# Patient Record
Sex: FEMALE | Race: BLACK OR AFRICAN AMERICAN | NOT HISPANIC OR LATINO | Employment: FULL TIME | URBAN - METROPOLITAN AREA
[De-identification: names, ages, dates, MRNs, and addresses within clinical notes are randomized per-mention and may not be internally consistent; named-entity substitution may affect disease eponyms.]

---

## 2017-01-31 ENCOUNTER — OFFICE VISIT (OUTPATIENT)
Dept: OBGYN | Facility: CLINIC | Age: 27
End: 2017-01-31
Payer: COMMERCIAL

## 2017-01-31 ENCOUNTER — OFFICE VISIT (OUTPATIENT)
Dept: DERMATOLOGY | Facility: CLINIC | Age: 27
End: 2017-01-31
Payer: COMMERCIAL

## 2017-01-31 VITALS
OXYGEN SATURATION: 100 % | SYSTOLIC BLOOD PRESSURE: 134 MMHG | BODY MASS INDEX: 35.99 KG/M2 | WEIGHT: 243 LBS | HEIGHT: 69 IN | HEART RATE: 88 BPM | DIASTOLIC BLOOD PRESSURE: 79 MMHG

## 2017-01-31 VITALS — DIASTOLIC BLOOD PRESSURE: 79 MMHG | SYSTOLIC BLOOD PRESSURE: 118 MMHG | OXYGEN SATURATION: 98 % | HEART RATE: 79 BPM

## 2017-01-31 DIAGNOSIS — L91.0 KELOID CICATRIX: ICD-10-CM

## 2017-01-31 DIAGNOSIS — L70.0 ACNE VULGARIS: Primary | ICD-10-CM

## 2017-01-31 DIAGNOSIS — N97.9 FEMALE INFERTILITY: Primary | ICD-10-CM

## 2017-01-31 DIAGNOSIS — N97.0 ANOVULATION: ICD-10-CM

## 2017-01-31 PROCEDURE — 99203 OFFICE O/P NEW LOW 30 MIN: CPT | Mod: 25 | Performed by: PHYSICIAN ASSISTANT

## 2017-01-31 PROCEDURE — 99214 OFFICE O/P EST MOD 30 MIN: CPT | Performed by: OBSTETRICS & GYNECOLOGY

## 2017-01-31 PROCEDURE — 11900 INJECT SKIN LESIONS </W 7: CPT | Performed by: PHYSICIAN ASSISTANT

## 2017-01-31 RX ORDER — CEPHALEXIN 500 MG/1
CAPSULE ORAL
Qty: 60 CAPSULE | Refills: 2 | Status: SHIPPED | OUTPATIENT
Start: 2017-01-31 | End: 2017-04-18

## 2017-01-31 RX ORDER — CLOMIPHENE CITRATE 50 MG/1
50 TABLET ORAL DAILY
Qty: 5 TABLET | Refills: 0 | Status: SHIPPED | OUTPATIENT
Start: 2017-01-31 | End: 2017-05-12

## 2017-01-31 RX ORDER — TRETINOIN 0.25 MG/G
CREAM TOPICAL
Qty: 45 G | Refills: 11 | Status: SHIPPED | OUTPATIENT
Start: 2017-01-31 | End: 2017-05-12

## 2017-01-31 RX ORDER — AZELAIC ACID 0.15 G/G
GEL TOPICAL
Qty: 50 G | Refills: 11 | Status: SHIPPED | OUTPATIENT
Start: 2017-01-31 | End: 2017-05-12

## 2017-01-31 NOTE — NURSING NOTE
"Initial /79 mmHg  Pulse 79  SpO2 98%  LMP 01/24/2017 Estimated body mass index is 36.41 kg/(m^2) as calculated from the following:    Height as of an earlier encounter on 1/31/17: 1.74 m (5' 8.5\").    Weight as of an earlier encounter on 1/31/17: 110.224 kg (243 lb). .      "

## 2017-01-31 NOTE — PROGRESS NOTES
HPI:   Syed Orantes is a 26 year old female who presents for acne.  chief complaint  Condition has been present for: several years - had as a teenager and then came back again in early 20's.  Pt complains of pain: Yes     Previous treatments include: did 10 days worth of tretinoin and doxycycline which didn't seem to help. Was placed on spironolactone but only did this a short time.   Menstrual cycles are regular: Yes   Condition flares around period: Yes  Areas Involved: face, chest and back  IPLEDGE #:   School:   Current Outpatient Prescriptions   Medication Sig Dispense Refill     cephALEXin (KEFLEX) 500 MG capsule 1 tab PO BID 60 capsule 2     Azelaic Acid 15 % gel Apply to face and chest 50 g 11     tretinoin (RETIN-A) 0.025 % cream Spread a pea size amount into affected area topically at bedtime.  Use sunscreen SPF>20. 45 g 11     clomiPHENE (CLOMID) 50 MG tablet Take 1 tablet (50 mg) by mouth daily Starting 3 days after the bleeding begins 5 tablet 0     multivitamin, therapeutic with minerals (MULTI-VITAMIN) TABS Take 1 tablet by mouth daily 100 tablet      magnesium 250 MG tablet Take 1 tablet by mouth daily 30 tablet      Allergies   Allergen Reactions     Amoxicillin Hives            Pcn [Penicillin G] Hives     Denies any other skin complaints, in general feels well: Yes  Review of symptoms otherwise negative:Yes    PHYSICAL EXAM:   A&Ox3: Yes   Well developed/well nourished female Yes   Mood appropriate Yes        Type 6 skin. Mood appropriate  Alert and Oriented X 3. Well developed, well nourished in no distress.  General appearance: Normal  Head including face: Normal  Eyes: conjunctiva and lids: Normal  Mouth: Lips, teeth, gums: Normal  Neck: Normal  Back: 1-2+ comedones, 6 keloid cicatrix  Cardiovascular: Exam of peripheral vascular system by observation for swelling, varicosities, edema: Normal  Extremities: digits/nails (clubbing): Normal  Right upper extremity: Normal  Left upper  extremity: Normal  Right lower extremity: Normal  Left lower extremity: Normal  Skin: Scalp and body hair: See below     Comedones Papules/Pustules Cysts Staining Scarring   Face/Neck 1+ 1+ jawline 0 1+ jawline 0   Chest 1+ 1+ 0 0 0   Back 1+ 0 0 0 6 keloids     Telangiectasias: No Fixed Erythema: No Exoriations: No   Other Physical Exam Findings:    ASSESSMENT & PLAN:     1. Acne Vulgaris - advised on diagnosis and treatment options. Discussed use of topical medications and antibiotics. She is currently trying to become pregnant - advised that we are quite limited with pregnancy and nursing. Was going to start Finacea, but pharmacy called and said this wasn't covered. Discussed starting an antibiotic as well as tretinoin. Advised that she may use tretinoin up until she becomes pregnant, but must stop once pregnant. Does tend to have sensitive skin.   --Start Keflex 500 mg BID x 3 months then stop  --Start tretinoin 0.025% cream to face, chest and back  --Gentle cleanser BID  --Gentle moisturizer BID    2.  Keloid cicatrix on back x 6 - advised. Discussed ILK to make areas flatter; advised that injections will do nothing for the color or skin texture. She would like to proceed with this.  --Kenalog 40 mg/cc injected to back x 6. Total of 0.4 cc's used.  Advised on risk of atrophy and failure to respond. Advised on aftercare.   --Can do once every 4 weeks as needed            Pt advised on use and risks including photosensitivity, allergic reactions, GI upset, headaches, nausea, erythema, scaling, vertigo, asthralgias, blood clots:Yes    Follow-up: 1 month  CC:   Scribed By: Quiana Lazo, MS, PA-C

## 2017-01-31 NOTE — PATIENT INSTRUCTIONS
If you do not have bleeding next month, let me know so I may place an order for Provera to initiate the bleeding.      Once you start bleeding, count the first day of bleeding as day 1.   Start the Clomid the 3rd day and take for 5 days.     Have the progesterone level repeated day 21.  Please make sure the lab does not do this tomorrow when you have your other labs drawn.

## 2017-01-31 NOTE — PROGRESS NOTES
"Syed is a 26 year old female, , here today for follow up infertility     We reviewed the ultrasound films from .  It is difficult to determine peripheral cysts are present.    She has not had abnormal hair growth.      Labs reviewed:      Component      Latest Ref Rng 11/15/2016 2016 2016   FSH       4.5 5.0    Lutropin       10.3     Prolactin      3 - 27 ug/L 10     DHEA Sulfate      35 - 430 ug/dL 126     TSH      0.40 - 4.00 mU/L 1.32     Estradiol        64    Progesterone         0.3       She started exercising , three times a week.     The medical history, social history and family history are reviewed and updated as indicated.         ROS: Twelve point review of systems was reviewed and negative except the above in the PMH and the HPI.    Exam  /79 mmHg  Pulse 88  Ht 5' 8.5\" (1.74 m)  Wt 243 lb (110.224 kg)  BMI 36.41 kg/m2  SpO2 100%  LMP 2017  Breastfeeding? No  General:  WNWD female, NAD  Alert  Oriented x 3  Gait:  Normal  Skin:  Normal skin turgor  HEENT:  NC/AT, EOMI  Abdomen:  Non-tender, non-distended.  Pelvic exam:  Not performed  Extremities:  No clubbing, no cyanosis and no edema.      Assessment  Female infertility  Anovulation      PLAN:  We reviewed the following, concerning PCOS.  The etiology remains unknown.    PCOS may cause metabolic sequelae, including an increased risk of diabetes and cardiovascular disease. Incidence varies depending upon the diagnostic criteria used.    we reviewed the PCOS patients are at risk for endometrial cancer, chronic anovulation, centripetal obesity and diabetes.   Obesity is a co-morbidity, but it is not a diagnostic criterion.  20% of PCOS patients are not obese.  Women with PCOS may have menstrual disorders and infertility.    The following workup was discussed with the patient and she voiced her understanding.   Discussed at length the diagnosis and treatment plans for polycystic ovary syndrome.  Discussed " PCOS as a syndrome that includes hyperandrogenism, irregular menstruation, and polycystic appearing ovaries.   Discussed lifelong implications such as endometrial hyperplasia, lipid abnormalities, diabetes, and increased risk of metabolic syndrome.    Discussed treatment plan based on patient's desire to conceive. Recommended weight loss, regular menstruation with provera, and possible use of metformin as insulin sensitizing agent.  Discussed using clomid but warned of increased risk of multiple gestation.  First line therapy is Clomid and 20-40% six month live birth rates.  50% will conceive on the 50 mg starting dose.  20% will conceive on the 100 mg per day dose.  Most pregnancies will occur within the first six ovulatory cycles.  There may be benefit to continued, longer use.    Metformin:  Metformin alone as first-line therapy for infertility has not been supported.  Clomid is 3x more effective at achieving live birth compared with metformin.  It has been suggested that there may be an increase in pregnancy rates by adding Clomid to Metformin.   The following labs are ordered based on the above information:  2 hour OGT, lipid profile, testosterone level and 21 day progesterone.    Prescription for Clomid 50 mg is given.  If she doesn't have bleeding next month, then she might need Provera for withdrawal bleeding.    Semen analysis is ordered for her .   At this time she does not want to reorder the ultrasound as it would increase her costs and would not provide much additional information that would influence the management approach.    TT 25 min  CT and review of records, greater than 50%  Edurado Arthur MD

## 2017-01-31 NOTE — MR AVS SNAPSHOT
After Visit Summary   1/31/2017    Syed Orantes    MRN: [id number]           Patient Information     Date Of Birth          1990        Visit Information        Provider Department      1/31/2017 1:15 PM Quiana Lazo PA-C [facility]        Today's Diagnoses     Acne vulgaris    -  1       Care Instructions    Directions for acne:    AM  1. Wash with Neutrogena Ultra Gentle foaming cleanser or CeraVe foaming cleanser  2. Finacea (prescription) - thin layer to face, neck and chest  3. Moisturizer over - CeraVe, Cetaphil or Vanicream  4. Makeup    PM  1. Wash   2. Finacea  3. Moisturizer    Start keflex (antibiotic) twice per day for 3 months then stop    Follow up in a month for injections; will inject kenalog to keloids today        Follow-ups after your visit        Your next 10 appointments already scheduled     Jan 31, 2017  1:15 PM   Return Visit with Quiana Lazo PA-C   [facility]    [address] [place] [id number] [phone]              Future tests that were ordered for you today     Open Future Orders        Priority Expected Expires Ordered    Glucose tolerance std non preg Routine  7/30/2017 1/31/2017    Lipid panel reflex to direct LDL Routine  1/31/2018 1/31/2017    Testosterone Free and Total Routine  1/31/2018 1/31/2017    Progesterone Routine  1/31/2018 1/31/2017            Who to contact     If you have questions or need follow up information about today's clinic visit or your schedule please contact [facility] directly at [phone].  Normal or non-critical lab and imaging results will be communicated to you by MyChart, letter or phone within 4 business days after the clinic has received the results. If you do not hear from us within 7 days, please contact the clinic through MyChart or phone. If you have a critical or abnormal lab  result, we will notify you by phone as soon as possible.  Submit refill requests through [employer] or call your pharmacy and they will forward the refill request to us. Please allow 3 business days for your refill to be completed.          Additional Information About Your Visit        [employer] Information     [employer] gives you secure access to your electronic health record. If you see a primary care provider, you can also send messages to your care team and make appointments. If you have questions, please call your primary care clinic.  If you do not have a primary care provider, please call [phone] and they will assist you.        Care EveryWhere ID     This is your Care EveryWhere ID. This could be used by other organizations to access your [place] medical records  [id number]        Your Vitals Were     Pulse Pulse Oximetry Last Period             79 98% 01/24/2017          Blood Pressure from Last 3 Encounters:   01/31/17 118/79   01/31/17 134/79   11/15/16 136/79    Weight from Last 3 Encounters:   01/31/17 110.224 kg (243 lb)   11/15/16 110.678 kg (244 lb)   05/04/16 104.327 kg (230 lb)              Today, you had the following     No orders found for display         Today's Medication Changes          These changes are accurate as of: 1/31/17  1:10 PM.  If you have any questions, ask your nurse or doctor.               Start taking these medicines.        Dose/Directions    Azelaic Acid 15 % gel   Used for:  Acne vulgaris   Started by:  Quiana Lazo PA-C        Apply to face and chest   Quantity:  50 g   Refills:  11       cephALEXin 500 MG capsule   Commonly known as:  KEFLEX   Used for:  Acne vulgaris   Started by:  Quiana Lazo PA-C        1 tab PO BID   Quantity:  60 capsule   Refills:  2       clomiPHENE 50 MG tablet   Commonly known as:  CLOMID   Used for:  Female infertility   Started by:  Eduardo Arthur MD        Dose:  50 mg   Take 1 tablet (50 mg) by mouth daily Starting 3  days after the bleeding begins   Quantity:  5 tablet   Refills:  0            Where to get your medicines      These medications were sent to [id number] - [place], [place] - [address] [facility] [address]     Phone:  [phone]    - Azelaic Acid 15 % gel  - cephALEXin 500 MG capsule  - clomiPHENE 50 MG tablet             Primary Care Provider Office Phone # Fax #    Gianna Price [phone]       [facility] [address] [facility] [address]        Thank you!     Thank you for choosing [facility]  for your care. Our goal is always to provide you with excellent care. Hearing back from our patients is one way we can continue to improve our services. Please take a few minutes to complete the written survey that you may receive in the mail after your visit with us. Thank you!             Your Updated Medication List - Protect others around you: Learn how to safely use, store and throw away your medicines at www.disposemymeds.org.          This list is accurate as of: 1/31/17  1:10 PM.  Always use your most recent med list.                   Brand Name Dispense Instructions for use    Azelaic Acid 15 % gel     50 g    Apply to face and chest       cephALEXin 500 MG capsule    KEFLEX    60 capsule    1 tab PO BID       clomiPHENE 50 MG tablet    CLOMID    5 tablet    Take 1 tablet (50 mg) by mouth daily Starting 3 days after the bleeding begins       magnesium 250 MG tablet     30 tablet    Take 1 tablet by mouth daily       Multi-vitamin Tabs tablet     100 tablet    Take 1 tablet by mouth daily

## 2017-01-31 NOTE — NURSING NOTE
"Chief Complaint   Patient presents with     RECHECK     irregular menses       Initial /79 mmHg  Pulse 88  Ht 5' 8.5\" (1.74 m)  Wt 243 lb (110.224 kg)  BMI 36.41 kg/m2  SpO2 100%  LMP 01/24/2017  Breastfeeding? No Estimated body mass index is 36.41 kg/(m^2) as calculated from the following:    Height as of this encounter: 5' 8.5\" (1.74 m).    Weight as of this encounter: 243 lb (110.224 kg).  BP completed using cuff size: torsten Gilliland MA 1/31/2017         "

## 2017-01-31 NOTE — MR AVS SNAPSHOT
After Visit Summary   1/31/2017    Syed Orantes    MRN: [id number]           Patient Information     Date Of Birth          1990        Visit Information        Provider Department      1/31/2017 10:00 AM Eduardo Arthur MD [facility]        Today's Diagnoses     Female infertility    -  1     Anovulation           Care Instructions      If you do not have bleeding next month, let me know so I may place an order for Provera to initiate the bleeding.      Once you start bleeding, count the first day of bleeding as day 1.   Start the Clomid the 3rd day and take for 5 days.     Have the progesterone level repeated day 21.  Please make sure the lab does not do this tomorrow when you have your other labs drawn.            Follow-ups after your visit        Your next 10 appointments already scheduled     Jan 31, 2017  1:15 PM   Return Visit with Quiana Lazo PA-C   [facility]    [address] [place] [id number] [phone]              Future tests that were ordered for you today     Open Future Orders        Priority Expected Expires Ordered    Glucose tolerance std non preg Routine  7/30/2017 1/31/2017    Lipid panel reflex to direct LDL Routine  1/31/2018 1/31/2017    Testosterone Free and Total Routine  1/31/2018 1/31/2017    Progesterone Routine  1/31/2018 1/31/2017            Who to contact     If you have questions or need follow up information about today's clinic visit or your schedule please contact [facility] directly at [phone].  Normal or non-critical lab and imaging results will be communicated to you by MyChart, letter or phone within 4 business days after the clinic has received the results. If you do not hear from us within 7 days, please contact the clinic through MyChart or phone. If you have a critical or abnormal lab result, we will notify you by  "phone as soon as possible.  Submit refill requests through [employer] or call your pharmacy and they will forward the refill request to us. Please allow 3 business days for your refill to be completed.          Additional Information About Your Visit        [employer] Information     [employer] gives you secure access to your electronic health record. If you see a primary care provider, you can also send messages to your care team and make appointments. If you have questions, please call your primary care clinic.  If you do not have a primary care provider, please call [phone] and they will assist you.        Care EveryWhere ID     This is your Care EveryWhere ID. This could be used by other organizations to access your [place] medical records  [id number]        Your Vitals Were     Pulse Height BMI (Body Mass Index) Pulse Oximetry Last Period Breastfeeding?    88 5' 8.5\" (1.74 m) 36.41 kg/m2 100% 01/24/2017 No       Blood Pressure from Last 3 Encounters:   01/31/17 134/79   11/15/16 136/79   05/04/16 135/82    Weight from Last 3 Encounters:   01/31/17 243 lb (110.224 kg)   11/15/16 244 lb (110.678 kg)   05/04/16 230 lb (104.327 kg)                 Today's Medication Changes          These changes are accurate as of: 1/31/17 11:06 AM.  If you have any questions, ask your nurse or doctor.               Start taking these medicines.        Dose/Directions    clomiPHENE 50 MG tablet   Commonly known as:  CLOMID   Used for:  Female infertility   Started by:  Eduardo Arthur MD        Dose:  50 mg   Take 1 tablet (50 mg) by mouth daily Starting 3 days after the bleeding begins   Quantity:  5 tablet   Refills:  0            Where to get your medicines      These medications were sent to [facility][id number] - [place], [place] - [address], [facility] [address]     Phone:  [phone]    - clomiPHENE 50 MG tablet             Primary Care Provider Office Phone # Fax #    Gianna " MD Albert [phone]       [facility] [address] [place] [address]  [place] [address]        Thank you!     Thank you for choosing [facility]  for your care. Our goal is always to provide you with excellent care. Hearing back from our patients is one way we can continue to improve our services. Please take a few minutes to complete the written survey that you may receive in the mail after your visit with us. Thank you!             Your Updated Medication List - Protect others around you: Learn how to safely use, store and throw away your medicines at www.disposemymeds.org.          This list is accurate as of: 1/31/17 11:06 AM.  Always use your most recent med list.                   Brand Name Dispense Instructions for use    clomiPHENE 50 MG tablet    CLOMID    5 tablet    Take 1 tablet (50 mg) by mouth daily Starting 3 days after the bleeding begins       magnesium 250 MG tablet     30 tablet    Take 1 tablet by mouth daily       Multi-vitamin Tabs tablet     100 tablet    Take 1 tablet by mouth daily

## 2017-01-31 NOTE — PATIENT INSTRUCTIONS
Directions for acne:    AM  1. Wash with Neutrogena Ultra Gentle foaming cleanser or CeraVe foaming cleanser  2. Finacea (prescription) - thin layer to face, neck and chest  3. Moisturizer over - CeraVe, Cetaphil or Vanicream  4. Makeup    PM  1. Wash   2. Finacea  3. Moisturizer    Start keflex (antibiotic) twice per day for 3 months then stop    Follow up in a month for injections; will inject kenalog to keloids today

## 2017-02-02 DIAGNOSIS — N97.9 FEMALE INFERTILITY: ICD-10-CM

## 2017-02-02 DIAGNOSIS — N97.0 ANOVULATION: ICD-10-CM

## 2017-02-02 LAB
CHOLEST SERPL-MCNC: 162 MG/DL
GLUCOSE 2H P 75 G GLC PO SERPL-MCNC: 113 MG/DL (ref 60–200)
GLUCOSE PRE 75 G GLC PO SERPL-MCNC: 94 MG/DL (ref 60–126)
HDLC SERPL-MCNC: 65 MG/DL
LDLC SERPL CALC-MCNC: 85 MG/DL
NONHDLC SERPL-MCNC: 97 MG/DL
TRIGL SERPL-MCNC: 60 MG/DL

## 2017-02-02 PROCEDURE — 82947 ASSAY GLUCOSE BLOOD QUANT: CPT | Performed by: OBSTETRICS & GYNECOLOGY

## 2017-02-02 PROCEDURE — 84403 ASSAY OF TOTAL TESTOSTERONE: CPT | Performed by: OBSTETRICS & GYNECOLOGY

## 2017-02-02 PROCEDURE — 36415 COLL VENOUS BLD VENIPUNCTURE: CPT | Performed by: OBSTETRICS & GYNECOLOGY

## 2017-02-02 PROCEDURE — 80061 LIPID PANEL: CPT | Performed by: OBSTETRICS & GYNECOLOGY

## 2017-02-02 PROCEDURE — 84270 ASSAY OF SEX HORMONE GLOBUL: CPT | Performed by: OBSTETRICS & GYNECOLOGY

## 2017-02-02 PROCEDURE — 82950 GLUCOSE TEST: CPT | Performed by: OBSTETRICS & GYNECOLOGY

## 2017-02-03 ENCOUNTER — TELEPHONE (OUTPATIENT)
Dept: DERMATOLOGY | Facility: CLINIC | Age: 27
End: 2017-02-03

## 2017-02-03 NOTE — TELEPHONE ENCOUNTER
PA required for Tretinoin 0.025 % and alternative drug for finacea. [facility] rejected it due to prerequisite therapy required (they want patient to try other meds before finacea) They are asking for alternative for finacea? Please advise if you want PA started for tretinoin and alternative for finacea?

## 2017-02-04 LAB
SHBG SERPL-SCNC: 21 NMOL/L (ref 30–135)
TESTOST FREE SERPL-MCNC: 1.33 NG/DL (ref 0.08–0.74)
TESTOST SERPL-MCNC: 58 NG/DL (ref 8–60)

## 2017-02-09 ENCOUNTER — MYC MEDICAL ADVICE (OUTPATIENT)
Dept: OBGYN | Facility: CLINIC | Age: 27
End: 2017-02-09

## 2017-02-14 NOTE — TELEPHONE ENCOUNTER
I called patient.   She has taken the Clomid.  She will have the luteal phase progesterone level drawn.    At this time with the desire to have a pregnancy, I would suggest not to do any management of the testosterone..  After pregnancy and breast feeding, then she may consider the combination OCPs to help increase the SHBG.  This will lower the free testosterone.  Currently, she has not noticed androgenic symptoms.    Questions seemed to be answered.   Eduardo Arthur MD

## 2017-02-27 DIAGNOSIS — N97.9 FEMALE INFERTILITY: ICD-10-CM

## 2017-02-27 LAB — PROGEST SERPL-MCNC: 6.3 NG/ML

## 2017-02-27 PROCEDURE — 36415 COLL VENOUS BLD VENIPUNCTURE: CPT | Performed by: OBSTETRICS & GYNECOLOGY

## 2017-02-27 PROCEDURE — 84144 ASSAY OF PROGESTERONE: CPT | Performed by: OBSTETRICS & GYNECOLOGY

## 2017-02-28 DIAGNOSIS — N97.9 FEMALE INFERTILITY: Primary | ICD-10-CM

## 2017-02-28 DIAGNOSIS — N97.0 PRIMARY ANOVULATORY INFERTILITY: ICD-10-CM

## 2017-02-28 RX ORDER — CLOMIPHENE CITRATE 50 MG/1
100 TABLET ORAL DAILY
Qty: 10 TABLET | Refills: 0 | Status: SHIPPED | OUTPATIENT
Start: 2017-02-28 | End: 2017-03-05

## 2017-03-29 DIAGNOSIS — N97.0 PRIMARY ANOVULATORY INFERTILITY: ICD-10-CM

## 2017-03-29 DIAGNOSIS — N97.9 FEMALE INFERTILITY: ICD-10-CM

## 2017-03-29 PROCEDURE — 84144 ASSAY OF PROGESTERONE: CPT | Performed by: OBSTETRICS & GYNECOLOGY

## 2017-03-29 PROCEDURE — 36415 COLL VENOUS BLD VENIPUNCTURE: CPT | Performed by: OBSTETRICS & GYNECOLOGY

## 2017-03-30 LAB — PROGEST SERPL-MCNC: 10.2 NG/ML

## 2017-04-04 DIAGNOSIS — N97.0 PRIMARY ANOVULATORY INFERTILITY: Primary | ICD-10-CM

## 2017-04-04 RX ORDER — CLOMIPHENE CITRATE 50 MG/1
100 TABLET ORAL DAILY
Qty: 10 TABLET | Refills: 4 | Status: SHIPPED | OUTPATIENT
Start: 2017-04-04 | End: 2017-05-12

## 2017-04-18 ENCOUNTER — OFFICE VISIT (OUTPATIENT)
Dept: FAMILY MEDICINE | Facility: CLINIC | Age: 27
End: 2017-04-18
Payer: COMMERCIAL

## 2017-04-18 VITALS
TEMPERATURE: 97.8 F | DIASTOLIC BLOOD PRESSURE: 82 MMHG | OXYGEN SATURATION: 100 % | WEIGHT: 246 LBS | HEART RATE: 100 BPM | SYSTOLIC BLOOD PRESSURE: 120 MMHG | BODY MASS INDEX: 36.43 KG/M2 | HEIGHT: 69 IN

## 2017-04-18 DIAGNOSIS — Z32.01 PREGNANCY TEST POSITIVE: Primary | ICD-10-CM

## 2017-04-18 LAB — BETA HCG QUAL IFA URINE: POSITIVE

## 2017-04-18 PROCEDURE — 84703 CHORIONIC GONADOTROPIN ASSAY: CPT | Performed by: FAMILY MEDICINE

## 2017-04-18 PROCEDURE — 99213 OFFICE O/P EST LOW 20 MIN: CPT | Performed by: FAMILY MEDICINE

## 2017-04-18 RX ORDER — PNV NO.95/FERROUS FUM/FOLIC AC 28MG-0.8MG
1 TABLET ORAL DAILY
Qty: 90 TABLET | Refills: 3 | Status: SHIPPED | OUTPATIENT
Start: 2017-04-18 | End: 2021-10-11

## 2017-04-18 NOTE — NURSING NOTE
"Chief Complaint   Patient presents with     Confirmation Of Pregnancy       Initial /82 (BP Location: Left arm, Patient Position: Chair, Cuff Size: Adult Large)  Pulse 100  Temp 97.8  F (36.6  C) (Oral)  Ht 5' 8.5\" (1.74 m)  Wt 246 lb (111.6 kg)  LMP 03/09/2017  SpO2 100%  BMI 36.86 kg/m2 Estimated body mass index is 36.86 kg/(m^2) as calculated from the following:    Height as of this encounter: 5' 8.5\" (1.74 m).    Weight as of this encounter: 246 lb (111.6 kg).  Medication Reconciliation: complete    "

## 2017-04-18 NOTE — PROGRESS NOTES
SUBJECTIVE:                                                    Syed Orantes is a 27 year old female who presents to clinic today for the following health issues:      Chief Complaint   Patient presents with     Confirmation Of Pregnancy     Pt is a   LMP 3-9-17  Pt is doing well    Pt was On Infertility Treatment    Problem list and histories reviewed & adjusted, as indicated.  Additional history: as documented    Patient Active Problem List   Diagnosis     CARDIOVASCULAR SCREENING; LDL GOAL LESS THAN 160     Acne     Menorrhagia     Primary anovulatory infertility     Female infertility     Past Surgical History:   Procedure Laterality Date      SECTION       CHOLECYSTECTOMY, LAPOROSCOPIC       HC INSERT IMPLANTABLE CONTRACEPTIVE CAPSULE       HC INSERTION INTRAUTERINE DEVICE       HC REMOVAL IMPLATABLE CONTRACEPTIVE CAPSULE       HC REMOVE INTRAUTERINE DEVICE         Social History   Substance Use Topics     Smoking status: Never Smoker     Smokeless tobacco: Never Used     Alcohol use Yes      Comment: socially      Family History   Problem Relation Age of Onset     Hypertension Mother      Colon Cancer No family hx of      Breast Cancer No family hx of          Current Outpatient Prescriptions   Medication Sig Dispense Refill     Prenatal Vit-Fe Fumarate-FA (PRENATAL PO) Take by mouth daily       Prenatal Vit-Fe Fumarate-FA (PRENATAL VITAMIN) 27-0.8 MG TABS Take 1 tablet by mouth daily 90 tablet 3     clomiPHENE (CLOMID) 50 MG tablet Take 2 tablets (100 mg) by mouth daily (Patient not taking: Reported on 2017) 10 tablet 4     clomiPHENE (CLOMID) 50 MG tablet Take 1 tablet (50 mg) by mouth daily Starting 3 days after the bleeding begins (Patient not taking: Reported on 2017) 5 tablet 0     Azelaic Acid 15 % gel Apply to face and chest (Patient not taking: Reported on 2017) 50 g 11     tretinoin (RETIN-A) 0.025 % cream Spread a pea size amount into affected area  "topically at bedtime.  Use sunscreen SPF>20. (Patient not taking: Reported on 4/18/2017) 45 g 11     multivitamin, therapeutic with minerals (MULTI-VITAMIN) TABS Take 1 tablet by mouth daily Reported on 4/18/2017 100 tablet      magnesium 250 MG tablet Take 1 tablet by mouth daily Reported on 4/18/2017 30 tablet      Allergies   Allergen Reactions     Amoxicillin Hives            Pcn [Penicillin G] Hives     Recent Labs   Lab Test  02/02/17   0800  11/15/16   1021  04/24/15   1002  06/06/13   1430  04/30/13   1231   LDL  85   --   78   --    --    HDL  65   --   56   --    --    TRIG  60   --   54   --    --    CR   --    --    --    --   0.71   GFRESTIMATED   --    --    --    --   >90   GFRESTBLACK   --    --    --    --   >90   POTASSIUM   --    --    --    --   4.1   TSH   --   1.32   --   1.95   --       BP Readings from Last 3 Encounters:   04/18/17 120/82   01/31/17 118/79   01/31/17 134/79    Wt Readings from Last 3 Encounters:   04/18/17 246 lb (111.6 kg)   01/31/17 243 lb (110.2 kg)   11/15/16 244 lb (110.7 kg)                  Labs reviewed in EPIC    Reviewed and updated as needed this visit by clinical staff  Allergies       Reviewed and updated as needed this visit by Provider         ROS:  C: NEGATIVE for fever, chills, change in weight  E/M: NEGATIVE for ear, mouth and throat problems  R: NEGATIVE for significant cough or SOB  CV: NEGATIVE for chest pain, palpitations or peripheral edema  GI: NEGATIVE for nausea, abdominal pain, heartburn, or change in bowel habits    OBJECTIVE:                                                    /82 (BP Location: Left arm, Patient Position: Chair, Cuff Size: Adult Large)  Pulse 100  Temp 97.8  F (36.6  C) (Oral)  Ht 5' 8.5\" (1.74 m)  Wt 246 lb (111.6 kg)  LMP 03/09/2017  SpO2 100%  BMI 36.86 kg/m2  Body mass index is 36.86 kg/(m^2).  GENERAL: healthy, alert and no distress  NECK: no adenopathy, no asymmetry, masses, or scars and thyroid normal to " palpation  RESP: lungs clear to auscultation - no rales, rhonchi or wheezes  CV: regular rate and rhythm, normal S1 S2, no S3 or S4, no murmur, click or rub, no peripheral edema and peripheral pulses strong  ABDOMEN: soft, nontender, no hepatosplenomegaly, no masses and bowel sounds normal    Diagnostic Test Results:  Results for orders placed or performed in visit on 04/18/17 (from the past 24 hour(s))   Beta HCG qual IFA urine   Result Value Ref Range    Beta HCG Qual IFA Urine Positive (A) NEG        ASSESSMENT/PLAN:                                                        1. Pregnancy test positive  Advised make First OB appointment   - Beta HCG qual IFA urine  - OB/GYN REFERRAL  - Prenatal Vit-Fe Fumarate-FA (PRENATAL VITAMIN) 27-0.8 MG TABS; Take 1 tablet by mouth daily  Dispense: 90 tablet; Refill: 3  Pt has been Told to stop all r medicines for her acne  Gianna Price MD  [facility]

## 2017-04-18 NOTE — MR AVS SNAPSHOT
After Visit Summary   4/18/2017    Syed Orantes    MRN: [id number]           Patient Information     Date Of Birth          1990        Visit Information        Provider Department      4/18/2017 6:20 PM Gianna Price MD [facility]        Today's Diagnoses     Pregnancy test positive    -  1      Care Instructions    [facility]    If you have any questions regarding to your visit please contact your care team:       Team Red:   Clinic Hours Telephone Number   Dr. Gabby Burleson NP   7am-7pm  Monday - Thursday   7am-5pm  Fridays  [phone]  (Appointment scheduling available 24/7)    Questions about your visit?   Team Line  [phone]   Urgent Care - [place] and [facility] - 11am-9pm Monday-Friday Saturday-Sunday- 9am-5pm   [place] - 5pm-9pm Monday-Friday Saturday-Sunday- 9am-5pm  [phone] - [place]   [phone] - [place]       What options do I have for visits at the clinic other than the traditional office visit?  To expand how we care for you, many of our providers are utilizing electronic visits (e-visits) and telephone visits, when medically appropriate, for interactions with their patients rather than a visit in the clinic.   We also offer nurse visits for many medical concerns. Just like any other service, we will bill your insurance company for this type of visit based on time spent on the phone with your provider. Not all insurance companies cover these visits. Please check with your medical insurance if this type of visit is covered. You will be responsible for any charges that are not paid by your insurance.      E-visits via [employer]:  generally incur a $35.00 fee.  Telephone visits:  Time spent on the phone: *charged based on time that is spent on the phone in increments of 10 minutes. Estimated cost:   5-10 mins $30.00   11-20 mins. $59.00   21-30 mins. $85.00     Use  Teng[employer] (secure email communication and access to your chart) to send your primary care provider a message or make an appointment. Ask someone on your Team how to sign up for [employer].  For a Price Quote for your services, please call our [employer] Price Line at [phone].      As always, Thank you for trusting us with your health care needs!    Discharged by Jessica Brown MA.          Follow-ups after your visit        Additional Services     OB/GYN REFERRAL       Your provider has referred you to:  [facility]: [facility] [phone]   [email or web address]    Please be aware that coverage of these services is subject to the terms and limitations of your health insurance plan.  Call member services at your health plan with any benefit or coverage questions.      Please bring the following to your appointment:  >>   Any x-rays, CTs or MRIs which have been performed.  Contact the facility where they were done to arrange for  prior to your scheduled appointment.  Any new CT, MRI or other procedures ordered by your specialist must be performed at a [place] facility or coordinated by your clinic's referral office.    >>   List of current medications   >>   This referral request   >>   Any documents/labs given to you for this referral                  Who to contact     If you have questions or need follow up information about today's clinic visit or your schedule please contact [facility] directly at [phone].  Normal or non-critical lab and imaging results will be communicated to you by Tengahhart, letter or phone within 4 business days after the clinic has received the results. If you do not hear from us within 7 days, please contact the clinic through Tengahhart or phone. If you have a critical or abnormal lab result, we will notify you by phone as soon as possible.  Submit refill requests through [employer] or call your pharmacy and they will forward the  "refill request to us. Please allow 3 business days for your refill to be completed.          Additional Information About Your Visit        Only [employer] Information     [employer] gives you secure access to your electronic health record. If you see a primary care provider, you can also send messages to your care team and make appointments. If you have questions, please call your primary care clinic.  If you do not have a primary care provider, please call [phone] and they will assist you.        Care EveryWhere ID     This is your Care EveryWhere ID. This could be used by other organizations to access your [facility] medical records  [id number]        Your Vitals Were     Pulse Temperature Height Last Period Pulse Oximetry BMI (Body Mass Index)    100 97.8  F (36.6  C) (Oral) 5' 8.5\" (1.74 m) 03/09/2017 100% 36.86 kg/m2       Blood Pressure from Last 3 Encounters:   04/18/17 120/82   01/31/17 118/79   01/31/17 134/79    Weight from Last 3 Encounters:   04/18/17 246 lb (111.6 kg)   01/31/17 243 lb (110.2 kg)   11/15/16 244 lb (110.7 kg)              We Performed the Following     Beta HCG qual IFA urine     OB/GYN REFERRAL          Today's Medication Changes          These changes are accurate as of: 4/18/17  6:29 PM.  If you have any questions, ask your nurse or doctor.               These medicines have changed or have updated prescriptions.        Dose/Directions    * PRENATAL PO   This may have changed:  Another medication with the same name was added. Make sure you understand how and when to take each.   Changed by:  Gianna Price MD        Take by mouth daily   Refills:  0       * Prenatal Vitamin 27-0.8 MG Tabs   This may have changed:  You were already taking a medication with the same name, and this prescription was added. Make sure you understand how and when to take each.   Used for:  Pregnancy test positive   Changed by:  Gianna Price MD        Dose:  1 tablet   Take 1 tablet by mouth daily   Quantity:  90 " tablet   Refills:  3       * Notice:  This list has 2 medication(s) that are the same as other medications prescribed for you. Read the directions carefully, and ask your doctor or other care provider to review them with you.      Stop taking these medicines if you haven't already. Please contact your care team if you have questions.     cephALEXin 500 MG capsule   Commonly known as:  KEFLEX   Stopped by:  Gianna Price MD                Where to get your medicines      These medications were sent to [facility] - [place] - [address]  [address], [place] [address]     Phone:  [phone]     Prenatal Vitamin 27-0.8 MG Tabs                Primary Care Provider Office Phone # Fax #    Gianna Price [phone]       [facility] [address] [address]        Thank you!     Thank you for choosing [facility]  for your care. Our goal is always to provide you with excellent care. Hearing back from our patients is one way we can continue to improve our services. Please take a few minutes to complete the written survey that you may receive in the mail after your visit with us. Thank you!             Your Updated Medication List - Protect others around you: Learn how to safely use, store and throw away your medicines at www.disposemymeds.org.          This list is accurate as of: 4/18/17  6:29 PM.  Always use your most recent med list.                   Brand Name Dispense Instructions for use    Azelaic Acid 15 % gel     50 g    Apply to face and chest       * clomiPHENE 50 MG tablet    CLOMID    5 tablet    Take 1 tablet (50 mg) by mouth daily Starting 3 days after the bleeding begins       * clomiPHENE 50 MG tablet    CLOMID    10 tablet    Take 2 tablets (100 mg) by mouth daily       magnesium 250 MG tablet     30 tablet    Take 1 tablet by mouth daily Reported on 4/18/2017       Multi-vitamin Tabs tablet      100 tablet    Take 1 tablet by mouth daily Reported on 4/18/2017       * PRENATAL PO      Take by mouth daily       * Prenatal Vitamin 27-0.8 MG Tabs     90 tablet    Take 1 tablet by mouth daily       tretinoin 0.025 % cream    RETIN-A    45 g    Spread a pea size amount into affected area topically at bedtime.  Use sunscreen SPF>20.       * Notice:  This list has 4 medication(s) that are the same as other medications prescribed for you. Read the directions carefully, and ask your doctor or other care provider to review them with you.

## 2017-04-18 NOTE — PATIENT INSTRUCTIONS
[facility]    If you have any questions regarding to your visit please contact your care team:       Team Red:   Clinic Hours Telephone Number   Dr. Gabby Burleson, NP   7am-7pm  Monday - Thursday   7am-5pm  Fridays  [phone]  (Appointment scheduling available 24/7)    Questions about your visit?   Team Line  [phone]   [facility] [place] [facility] 11am-9pm Monday-Friday Saturday-Sunday- 9am-5pm   [place] - 5pm-9pm Monday-Friday Saturday-Sunday- 9am-5pm  [phone] - [place]   [phone] - [place]       What options do I have for visits at the clinic other than the traditional office visit?  To expand how we care for you, many of our providers are utilizing electronic visits (e-visits) and telephone visits, when medically appropriate, for interactions with their patients rather than a visit in the clinic.   We also offer nurse visits for many medical concerns. Just like any other service, we will bill your insurance company for this type of visit based on time spent on the phone with your provider. Not all insurance companies cover these visits. Please check with your medical insurance if this type of visit is covered. You will be responsible for any charges that are not paid by your insurance.      E-visits via [employer]:  generally incur a $35.00 fee.  Telephone visits:  Time spent on the phone: *charged based on time that is spent on the phone in increments of 10 minutes. Estimated cost:   5-10 mins $30.00   11-20 mins. $59.00   21-30 mins. $85.00     Use [employer] (secure email communication and access to your chart) to send your primary care provider a message or make an appointment. Ask someone on your Team how to sign up for [employer].  For a Price Quote for your services, please call our [employer] Price Line at [phone].      As always, Thank you for trusting us with your health care needs!    Discharged  by Jessica Brown MA.

## 2017-04-21 ENCOUNTER — MYC MEDICAL ADVICE (OUTPATIENT)
Dept: OBGYN | Facility: CLINIC | Age: 27
End: 2017-04-21

## 2017-04-24 NOTE — TELEPHONE ENCOUNTER
Noted LMP 03-09-17 6w4d  Return call to pt. Pt reported she had dark brown vaginal discharge only on toilet tissue after voiding. Pt denied any in her panties or in the toilet water. Pt stated she had some scant vaginal bleeding from 04-18-17 through 04-22-17. Pt no abd pain/cramping. Pt reported some occasional h/a, breast tenderness, and some nausea so is taking PNV at hs. Explained implantation bleeding. Recommended she monitor symptoms and call back if severe abdominal cramping or heavy bldg-soaking a pad per hour. Recommended pelvic rest (nothing in the vagina including tampons/douching, no intercourse, no heavy lifting > 25 lbs, or strenuous exercise) until her first OB appt and to increase water intake to  oz/day. Explained she could get some cramping/abd pain if her water intake is low and becomes dehydrated. Pt stated she notes that when she increases her water intake her h/a resolve. Explained often times women are not super morning sick at this time in pregnancy and does not necessarily mean progesterone is low if she does not have morning sickness. Noted no hx of AB's. Pt stated she did read it on line and perhaps that was not wise. Explained if symptoms worsen worsen to call the clinic. Noted first OB appt on 05-12-17. Pt verbalized understanding and agreed to follow plan. Chana Dewitt RN, BAN

## 2017-05-12 ENCOUNTER — PRENATAL OFFICE VISIT (OUTPATIENT)
Dept: OBGYN | Facility: CLINIC | Age: 27
End: 2017-05-12
Payer: COMMERCIAL

## 2017-05-12 VITALS
BODY MASS INDEX: 37.61 KG/M2 | DIASTOLIC BLOOD PRESSURE: 79 MMHG | WEIGHT: 251 LBS | SYSTOLIC BLOOD PRESSURE: 120 MMHG | HEART RATE: 85 BPM | OXYGEN SATURATION: 100 %

## 2017-05-12 DIAGNOSIS — O09.01 PREGNANCY WITH HISTORY OF INFERTILITY, FIRST TRIMESTER: Primary | ICD-10-CM

## 2017-05-12 PROBLEM — O09.00 PREGNANCY WITH HISTORY OF INFERTILITY: Status: ACTIVE | Noted: 2017-05-12

## 2017-05-12 LAB
ANISOCYTOSIS BLD QL SMEAR: SLIGHT
BASOPHILS # BLD AUTO: 0 10E9/L (ref 0–0.2)
BASOPHILS NFR BLD AUTO: 0.2 %
DIFFERENTIAL METHOD BLD: ABNORMAL
EOSINOPHIL # BLD AUTO: 0.1 10E9/L (ref 0–0.7)
EOSINOPHIL NFR BLD AUTO: 0.5 %
ERYTHROCYTE [DISTWIDTH] IN BLOOD BY AUTOMATED COUNT: 22.8 % (ref 10–15)
HCT VFR BLD AUTO: 32.9 % (ref 35–47)
HGB BLD-MCNC: 10.7 G/DL (ref 11.7–15.7)
LYMPHOCYTES # BLD AUTO: 1.3 10E9/L (ref 0.8–5.3)
LYMPHOCYTES NFR BLD AUTO: 12.1 %
MCH RBC QN AUTO: 25.8 PG (ref 26.5–33)
MCHC RBC AUTO-ENTMCNC: 32.5 G/DL (ref 31.5–36.5)
MCV RBC AUTO: 79 FL (ref 78–100)
MONOCYTES # BLD AUTO: 1 10E9/L (ref 0–1.3)
MONOCYTES NFR BLD AUTO: 9.5 %
NEUTROPHILS # BLD AUTO: 8.5 10E9/L (ref 1.6–8.3)
NEUTROPHILS NFR BLD AUTO: 77.7 %
PLATELET # BLD AUTO: 333 10E9/L (ref 150–450)
PLATELET # BLD EST: NORMAL 10*3/UL
RBC # BLD AUTO: 4.15 10E12/L (ref 3.8–5.2)
WBC # BLD AUTO: 11 10E9/L (ref 4–11)

## 2017-05-12 PROCEDURE — 86780 TREPONEMA PALLIDUM: CPT | Performed by: OBSTETRICS & GYNECOLOGY

## 2017-05-12 PROCEDURE — 87389 HIV-1 AG W/HIV-1&-2 AB AG IA: CPT | Performed by: OBSTETRICS & GYNECOLOGY

## 2017-05-12 PROCEDURE — 86762 RUBELLA ANTIBODY: CPT | Performed by: OBSTETRICS & GYNECOLOGY

## 2017-05-12 PROCEDURE — 86850 RBC ANTIBODY SCREEN: CPT | Performed by: OBSTETRICS & GYNECOLOGY

## 2017-05-12 PROCEDURE — 86901 BLOOD TYPING SEROLOGIC RH(D): CPT | Performed by: OBSTETRICS & GYNECOLOGY

## 2017-05-12 PROCEDURE — 86900 BLOOD TYPING SEROLOGIC ABO: CPT | Performed by: OBSTETRICS & GYNECOLOGY

## 2017-05-12 PROCEDURE — 87340 HEPATITIS B SURFACE AG IA: CPT | Performed by: OBSTETRICS & GYNECOLOGY

## 2017-05-12 PROCEDURE — 36415 COLL VENOUS BLD VENIPUNCTURE: CPT | Performed by: OBSTETRICS & GYNECOLOGY

## 2017-05-12 PROCEDURE — 85025 COMPLETE CBC W/AUTO DIFF WBC: CPT | Performed by: OBSTETRICS & GYNECOLOGY

## 2017-05-12 PROCEDURE — 99207 ZZC FIRST OB VISIT: CPT | Performed by: OBSTETRICS & GYNECOLOGY

## 2017-05-12 PROCEDURE — 87086 URINE CULTURE/COLONY COUNT: CPT | Performed by: OBSTETRICS & GYNECOLOGY

## 2017-05-12 NOTE — PROGRESS NOTES
INITIAL OB ASSESSMENT............................................Date: 2017                            ---------------------    Name: Syed Orantes.......................................................................Plan Number: [id number]    Age: 27 year old   : 1990  Phone: [phone] (home)   Address: [address] [place] [address]    Marital Status:    Race/Ethnicity:   Occupation:  [employer]  Partner's Name:  Shaquille Orantes, Partner's Occupation:  Security Tech    OB Physician: Eduardo Arthur       LMP:  Patient's LMP from OB Dating Form:  Patient's last menstrual period was 2017 (exact date).  Regular menses? yes  Menses every month.   Length of menses: 7 days  Pregnancy achieved with the use of Clomid.       Obstetrical History  ===================  Obstetric History       T1      TAB0   SAB0   E0   M0   L1       # Outcome Date GA Lbr Rush/2nd Weight Sex Delivery Anes PTL Lv   2 Current            1 Term 09 40w0d  7 lb (3.175 kg) M -SEC EPI  Y      Name: Codeion          Past Medical History:  Past Medical History:   Diagnosis Date     Cholelithiasis and acute cholecystitis with obstruction      Cholelithiasis without obstruction 2011       Past Surgical History:  Past Surgical History:   Procedure Laterality Date      SECTION       CHOLECYSTECTOMY, LAPOROSCOPIC       COSMETIC SURGERY      breast reduction      HC INSERT IMPLANTABLE CONTRACEPTIVE CAPSULE       HC INSERTION INTRAUTERINE DEVICE       HC REMOVAL IMPLATABLE CONTRACEPTIVE CAPSULE       HC REMOVE INTRAUTERINE DEVICE         Current Outpatient Prescriptions   Medication Sig Dispense Refill     Prenatal Vit-Fe Fumarate-FA (PRENATAL VITAMIN) 27-0.8 MG TABS Take 1 tablet by mouth daily 90 tablet 3       Allergies   Allergen Reactions     Amoxicillin Hives            Pcn [Penicillin G] Hives        Social History     Social History     Marital status:      Spouse name: Shaquille     Number of children: 1     Years of education: 14     Occupational History     [employer]     Social History Main Topics     Smoking status: Never Smoker     Smokeless tobacco: Never Used     Alcohol use Yes      Comment: socially      Drug use: No     Sexual activity: Yes     Partners: Male     Other Topics Concern     Parent/Sibling W/ Cabg, Mi Or Angioplasty Before 65f 55m? No     Social History Narrative       , Children  No substance abuse, environmental exposures, mental health risks and No financial concerns. She has a 14 year old Benton Leon.  Good Partner support.       Family History   Problem Relation Age of Onset     Hypertension Mother      Thyroid Disease Maternal Grandmother      Colon Cancer No family hx of      Breast Cancer No family hx of        Past Medical History of Father of Baby:   No significant medical history     No known genetic disease in patient's 1st or 2nd degree relatives  No known genetic diseases in the FOB's 1st or 2nd degree relatives    REVIEW OF SYMPTOMS:   History Since Last Menstrual Period:    nausea, fatigue and breast tenderness     PHYSICAL EXAM:  /79 (BP Location: Right arm, Cuff Size: Adult Large)  Pulse 85  Wt 251 lb (113.9 kg)  LMP 03/09/2017 (Exact Date)  SpO2 100%  BMI 37.61 kg/m2  General:  WNWD female, NAD  Oriented:  X 3  Alert  PSYCH:  mentation appears normal., affect and mood normal  HEENT:  NC/AT, EOMI  NECK:  Neck supple. No adenopathy. Thyroid symmetric, normal size,, Carotids without bruits.  HEART:  RRR  LUNGS:  Clear to auscultation.  Good respiratory effort  BREASTS: declined   ABDOMEN: Benign, Soft, flat, non-tender, No masses, organomegaly and Bowel sounds normoactive FHT's not heard  VULVA: no masses or lesions seen  BUS:  Bartholin's, Urethra, [place]'s normal  VAGINA:  No masses or lesions seen.   CERVIX:   Parous, closed, mobile, no discharge  UTERUS:  Normal shape, position and consistency and Nontender; 8-10 week size  ADNEXA:  No masses palpated, non-tender  EXTREMITIES:No cyanosis, clubbing, warm and well perfused and No edema   GAIT: normal including tandem walk, heel and toe walk.        Assessment:   IUP at 9w 1 d  Pregnancy with history of infertility.       Plan:  Options for  testing for chromosomal and birth defects were discussed with the patient.  Diagnostic tests include CVS and Amniocentesis.  We discussed that these tests are definitive but invasive and do carry a risk of fetal loss.    Screening tests include nuchal translucency/blood marker testing in the first trimester and quad screening in the second trimester.  We discussed that these are screening tests and not diagnostic tests and that false positives and negatives are a distinct possibility.  The patient will discuss with her  and decide.  We discussed physician coverage, tertiary support, diet, exercise, weight gain, schedule of visits, routine and indicated ultrasounds, and childbirth education.   Labs done today  RTC 4 weeks  Schedule for ultrasound since no FHT heard on ausculation.      Eduardo Arthur MD

## 2017-05-12 NOTE — NURSING NOTE
"Chief Complaint   Patient presents with     Prenatal Care     First OB       Initial /79 (BP Location: Right arm, Cuff Size: Adult Large)  Pulse 85  Wt 251 lb (113.9 kg)  LMP 03/09/2017  SpO2 100%  BMI 37.61 kg/m2 Estimated body mass index is 37.61 kg/(m^2) as calculated from the following:    Height as of 4/18/17: 5' 8.5\" (1.74 m).    Weight as of this encounter: 251 lb (113.9 kg).  Medication Reconciliation: natalya Gilliland MA 5/12/2017         "

## 2017-05-13 LAB
BACTERIA SPEC CULT: NORMAL
MICRO REPORT STATUS: NORMAL
SPECIMEN SOURCE: NORMAL
T PALLIDUM IGG+IGM SER QL: NEGATIVE

## 2017-05-13 ASSESSMENT — PATIENT HEALTH QUESTIONNAIRE - PHQ9: SUM OF ALL RESPONSES TO PHQ QUESTIONS 1-9: 2

## 2017-05-14 LAB
HBV SURFACE AG SERPL QL IA: NONREACTIVE
HIV 1+2 AB+HIV1 P24 AG SERPL QL IA: NORMAL

## 2017-05-15 ENCOUNTER — RADIANT APPOINTMENT (OUTPATIENT)
Dept: ULTRASOUND IMAGING | Facility: CLINIC | Age: 27
End: 2017-05-15
Attending: OBSTETRICS & GYNECOLOGY
Payer: COMMERCIAL

## 2017-05-15 DIAGNOSIS — O09.01 PREGNANCY WITH HISTORY OF INFERTILITY, FIRST TRIMESTER: ICD-10-CM

## 2017-05-15 LAB
ABO + RH BLD: NORMAL
ABO + RH BLD: NORMAL
BLD GP AB SCN SERPL QL: NORMAL
BLOOD BANK CMNT PATIENT-IMP: NORMAL
RUBV IGG SERPL IA-ACNC: 27 IU/ML
SPECIMEN EXP DATE BLD: NORMAL

## 2017-05-15 PROCEDURE — 76802 OB US < 14 WKS ADDL FETUS: CPT

## 2017-05-15 PROCEDURE — 76801 OB US < 14 WKS SINGLE FETUS: CPT

## 2017-06-09 ENCOUNTER — PRENATAL OFFICE VISIT (OUTPATIENT)
Dept: OBGYN | Facility: CLINIC | Age: 27
End: 2017-06-09
Payer: COMMERCIAL

## 2017-06-09 VITALS
SYSTOLIC BLOOD PRESSURE: 122 MMHG | BODY MASS INDEX: 38.06 KG/M2 | HEART RATE: 79 BPM | WEIGHT: 254 LBS | OXYGEN SATURATION: 99 % | DIASTOLIC BLOOD PRESSURE: 79 MMHG

## 2017-06-09 DIAGNOSIS — O09.02 PREGNANCY WITH HISTORY OF INFERTILITY, SECOND TRIMESTER: ICD-10-CM

## 2017-06-09 DIAGNOSIS — O30.092 TWIN GESTATION, UNABLE TO DETERMINE NUMBER OF PLACENTA AND NUMBER OF AMNIOTIC SACS IN SECOND TRIMESTER: Primary | ICD-10-CM

## 2017-06-09 PROCEDURE — 99207 ZZC PRENATAL VISIT: CPT | Performed by: OBSTETRICS & GYNECOLOGY

## 2017-06-09 NOTE — MR AVS SNAPSHOT
After Visit Summary   6/9/2017    Syed Orantes    MRN: [id number]           Patient Information     Date Of Birth          1990        Visit Information        Provider Department      6/9/2017 8:15 AM Eduardo Arthur MD [facility]         Follow-ups after your visit        Your next 10 appointments already scheduled     Jul 05, 2017  8:30 AM CDT   ESTABLISHED PRENATAL with Eduardo Arthur MD   [facility] ([address] [id number] [phone]              Who to contact     If you have questions or need follow up information about today's clinic visit or your schedule please contact [facility] directly at [phone].  Normal or non-critical lab and imaging results will be communicated to you by MyChart, letter or phone within 4 business days after the clinic has received the results. If you do not hear from us within 7 days, please contact the clinic through MyChart or phone. If you have a critical or abnormal lab result, we will notify you by phone as soon as possible.  Submit refill requests through Bioniz or call your pharmacy and they will forward the refill request to us. Please allow 3 business days for your refill to be completed.          Additional Information About Your Visit        MyChart Information     Bioniz gives you secure access to your electronic health record. If you see a primary care provider, you can also send messages to your care team and make appointments. If you have questions, please call your primary care clinic.  If you do not have a primary care provider, please call [phone] and they will assist you.        Care EveryWhere ID     This is your Care EveryWhere ID. This could be used by other organizations to access your [place] medical records  [id number]        Your Vitals Were     Pulse Last Period Pulse Oximetry BMI (Body Mass Index)           79 03/09/2017 (Exact Date) 99% 38.06 kg/m2         Blood Pressure from Last 3 Encounters:   06/09/17 122/79   05/12/17 120/79   04/18/17 120/82    Weight from Last 3 Encounters:   06/09/17 254 lb (115.2 kg)   05/12/17 251 lb (113.9 kg)   04/18/17 246 lb (111.6 kg)              Today, you had the following     No orders found for display       Primary Care Provider Office Phone # Fax #    Gianna Price [phone]       [address] [address]        Thank you!     Thank you for choosing [facility]  for your care. Our goal is always to provide you with excellent care. Hearing back from our patients is one way we can continue to improve our services. Please take a few minutes to complete the written survey that you may receive in the mail after your visit with us. Thank you!             Your Updated Medication List - Protect others around you: Learn how to safely use, store and throw away your medicines at www.disposemymeds.org.          This list is accurate as of: 6/9/17  8:29 AM.  Always use your most recent med list.                   Brand Name Dispense Instructions for use    Prenatal Vitamin 27-0.8 MG Tabs     90 tablet    Take 1 tablet by mouth daily

## 2017-06-09 NOTE — NURSING NOTE
"Chief Complaint   Patient presents with     Prenatal Care     13.1 weeks       Initial /79 (BP Location: Right arm, Cuff Size: Adult Large)  Pulse 79  Wt 254 lb (115.2 kg)  LMP 03/09/2017 (Exact Date)  SpO2 99%  BMI 38.06 kg/m2 Estimated body mass index is 38.06 kg/(m^2) as calculated from the following:    Height as of 4/18/17: 5' 8.5\" (1.74 m).    Weight as of this encounter: 254 lb (115.2 kg).  Medication Reconciliation: complete   JORDAN Gilliland 6/9/2017         "

## 2017-06-09 NOTE — PROGRESS NOTES
13w1d   Eating well.  Some nausea, but no emesis.   Discussed genetic screening.  Declines at this time.   RTC 4 weeks  Eduardo Arthur MD

## 2017-06-25 ENCOUNTER — NURSE TRIAGE (OUTPATIENT)
Dept: NURSING | Facility: CLINIC | Age: 27
End: 2017-06-25

## 2017-06-25 NOTE — TELEPHONE ENCOUNTER
"Caller states that 20-30 minutes ago she got up and felt a warm fluid gush down from her vagina down her leg and it was clear but tinged pink with blood, she is 16 weeks pregnant with twins and is anxious, guidelines recommend ER and she requested and agreed to on call MD prado. Paged Dr. Jasmine on call OB to call patient back at [phone] via page  Barbara at 4:54 pm.   Reason for Disposition    < 20 weeks pregnant    Leakage of fluid from vagina    Additional Information    Leakage of fluid (trickle, gush) from the vagina    Negative: [1] SEVERE abdominal pain (e.g., excruciating) AND [2] constant AND [3] present > 1 hour    Negative: Severe bleeding (e.g., continuous red blood from vagina, or large blood clots)    Negative: Umbilical cord hanging out of the vagina (shiny, white, curled appearance, \"like telephone cord\")    Negative: Uncontrollable urge to push (i.e., feels like baby is coming out now)    Negative: Can see baby    Negative: Sounds like a life-threatening emergency to the triager    Negative: Vaginal bleeding    Answer Assessment - Initial Assessment Questions  1. ONSET: \"When did the symptoms begin?\"         20-30 minutes ago  2. CONTRACTIONS: \"Are you having any contractions?\" If yes, ask: \"Describe the contractions that you are having.\" (e.g., duration, frequency, regularity, severity)      No contractions, just a lot of fetal movement  3. BITA: \"What date are you expecting to deliver?\"      16 weeks pregnant with twins  4. PARITY: \"Have you had a baby before?\" If yes, \"How long did the labor last?\"      no  5. FETAL MOVEMENT: \"Has the baby's movement decreased or changed significantly from normal?\"      same  6. OTHER SYMPTOMS: \"Do you have any other symptoms?\" (e.g., abdominal pain, vaginal bleeding, fever, hand/facial swelling)      No other symptoms just the vaginal discharge    Protocols used: PREGNANCY - RUPTURE OF MEMBRANES-ADULT-AH, PREGNANCY - VAGINAL " DISCHARGE-ADULT-    Larissa Mayfield, RN, BSN  [facility]

## 2017-06-26 ENCOUNTER — TELEPHONE (OUTPATIENT)
Dept: OBGYN | Facility: CLINIC | Age: 27
End: 2017-06-26

## 2017-06-26 DIAGNOSIS — O30.092 TWIN GESTATION, UNABLE TO DETERMINE NUMBER OF PLACENTA AND NUMBER OF AMNIOTIC SACS IN SECOND TRIMESTER: ICD-10-CM

## 2017-06-26 NOTE — TELEPHONE ENCOUNTER
Reason for Call:  Other appointment    Detailed comments: patient calling to check the status of this call, looking to be seen ASAP    Phone Number Patient can be reached at: Home number on file [phone] (home)    Best Time: any time    Can we leave a detailed message on this number? YES    Call taken on 6/26/2017 at 1:58 PM by Tory Bowden

## 2017-06-26 NOTE — TELEPHONE ENCOUNTER
Reviewed [facility] notes and patient was directed on 06-25-17 at 1933 by Dr. Jasmine to follow up in clinic this week.     Phone call to patient. She stated bleeding has slowed to a brownish spot only on toilet tissue after she voids. Offered to schedule appt with Dr. Arthur tomorrow and patient declined. Patient requested appt on 06-28-17 or 06-30-17. Explained not in clinic on 06-28-17. Offered and appt at  on 06-29-17. Patient declined. Scheduled per patient's request at 0745 on 06-30-17. Patient appreciative of assistance. Chana Dewitt RN, BAN

## 2017-06-26 NOTE — TELEPHONE ENCOUNTER
Reason for Call:  Other appointment    Detailed comments:  Patient calling. She is 16 weeks pregnant with twins. She is spotting. Per [facility]. Needs to be seen in two days. Please call and advise. (prefer mornings)    Phone Number Patient can be reached at: Home number on file [phone] (home)    Best Time:  Any     Can we leave a detailed message on this number? YES    Call taken on 6/26/2017 at 9:13 AM by Lisa Valladares

## 2017-06-30 ENCOUNTER — PRENATAL OFFICE VISIT (OUTPATIENT)
Dept: OBGYN | Facility: CLINIC | Age: 27
End: 2017-06-30
Payer: COMMERCIAL

## 2017-06-30 VITALS
DIASTOLIC BLOOD PRESSURE: 77 MMHG | OXYGEN SATURATION: 99 % | BODY MASS INDEX: 38.24 KG/M2 | HEART RATE: 89 BPM | SYSTOLIC BLOOD PRESSURE: 131 MMHG | WEIGHT: 255.2 LBS

## 2017-06-30 DIAGNOSIS — O30.092 TWIN GESTATION, UNABLE TO DETERMINE NUMBER OF PLACENTA AND NUMBER OF AMNIOTIC SACS IN SECOND TRIMESTER: ICD-10-CM

## 2017-06-30 PROCEDURE — 99207 ZZC PRENATAL VISIT: CPT | Performed by: OBSTETRICS & GYNECOLOGY

## 2017-06-30 NOTE — NURSING NOTE
"Chief Complaint   Patient presents with     Prenatal Care     16.1 weeks       Initial /77 (BP Location: Right arm, Cuff Size: Adult Regular)  Pulse 89  Wt 255 lb 3.2 oz (115.8 kg)  LMP 03/09/2017 (Exact Date)  SpO2 99%  BMI 38.24 kg/m2 Estimated body mass index is 38.24 kg/(m^2) as calculated from the following:    Height as of 4/18/17: 5' 8.5\" (1.74 m).    Weight as of this encounter: 255 lb 3.2 oz (115.8 kg).  Medication Reconciliation: complete   JORDAN Gilliland 6/30/2017         "

## 2017-06-30 NOTE — LETTER
June 30, 2017      Syed Orantes  [address] [facility] [address]          To whom it may concern    I have been seeing Syed for her twin gestation, prenatal care.  Due to the high risk aspect of her pregnancy, she should be parking as close as possible for work.       Sincerely,            Eduardo Arthur MD

## 2017-06-30 NOTE — PROGRESS NOTES
16w1d   Eating well.  Nausea: some.   Discussed genetic screening.  Declines.   Had bleeding last week, went to [facility] and no concerning findings noted. Ultrasound reviewed.   Pelvic rest.   RTC 4 weeks  Eduardo Arthur MD

## 2017-06-30 NOTE — MR AVS SNAPSHOT
After Visit Summary   6/30/2017    Syed Orantes    MRN: [id number]           Patient Information     Date Of Birth          1990        Visit Information        Provider Department      6/30/2017 7:45 AM Eduardo Arthur MD [facility]        Today's Diagnoses     Twin gestation, unable to determine number of placenta and number of amniotic sacs in second trimester           Follow-ups after your visit        Follow-up notes from your care team     Return in about 4 weeks (around 7/28/2017) for prenatal visit.      Your next 10 appointments already scheduled     Jul 24, 2017  8:00 AM CDT   US OB 2/3 TRIMESTER COMP TWINS with FKUS1   [facility] ([facility])    [address] [place] [id number] [phone]           Please bring a list of your medicines (including vitamins, minerals and over-the-counter drugs). Also, tell your doctor about any allergies you may have. Wear comfortable clothes and leave your valuables at home.  If you re less than 20 weeks drink four 8-ounce glasses of fluid an hour before your exam. If you need to empty your bladder before your exam, try to release only a little urine. Then, drink another glass of fluid.  You may have up to two family members in the exam room. If you bring a small child, an adult must be there to care for him or her.  Please call the Imaging Department at your exam site with any questions.            Jul 26, 2017  8:00 AM CDT   ESTABLISHED PRENATAL with Eduardo Arthur MD   [facility] [place] ([facility])    [address] [place] [id number] [phone]              Future tests that were ordered for you today     Open Future Orders        Priority Expected Expires Ordered    US OB > 14 Weeks Complete, Multiple Routine 7/24/2017 6/30/2018 6/30/2017    US OB > 14 Weeks Complete Single Routine  6/30/2018 6/30/2017            Who to contact      If you have questions or need follow up information about today's clinic visit or your schedule please contact [facility] directly at [phone].  Normal or non-critical lab and imaging results will be communicated to you by MyChart, letter or phone within 4 business days after the clinic has received the results. If you do not hear from us within 7 days, please contact the clinic through Gigithart or phone. If you have a critical or abnormal lab result, we will notify you by phone as soon as possible.  Submit refill requests through GlocalReach or call your pharmacy and they will forward the refill request to us. Please allow 3 business days for your refill to be completed.          Additional Information About Your Visit        GigitharDecisiv Information     GlocalReach gives you secure access to your electronic health record. If you see a primary care provider, you can also send messages to your care team and make appointments. If you have questions, please call your primary care clinic.  If you do not have a primary care provider, please call [phone] and they will assist you.        Care EveryWhere ID     This is your Care EveryWhere ID. This could be used by other organizations to access your [place] medical records  [id number]        Your Vitals Were     Pulse Last Period Pulse Oximetry BMI (Body Mass Index)          89 03/09/2017 (Exact Date) 99% 38.24 kg/m2         Blood Pressure from Last 3 Encounters:   06/30/17 131/77   06/09/17 122/79   05/12/17 120/79    Weight from Last 3 Encounters:   06/30/17 255 lb 3.2 oz (115.8 kg)   06/09/17 254 lb (115.2 kg)   05/12/17 251 lb (113.9 kg)               Primary Care Provider Office Phone # Fax #    Gianna Price [phone]       [address] [address]        Equal Access to Services     MORRIS KNUTSON AH: Dannielle Adkins, eva collier, boyd alves  elma espinozajaneomar reynoldsLawandaaaezequiel ah. So [facility] [phone].    ATENCIÓN: Si habla barby, tiene a wise disposición servicios gratuitos de asistencia lingüística. Erin al [phone].    We comply with applicable federal civil rights laws and [place] laws. We do not discriminate on the basis of race, color, national origin, age, disability sex, sexual orientation or gender identity.            Thank you!     Thank you for choosing [facility]  for your care. Our goal is always to provide you with excellent care. Hearing back from our patients is one way we can continue to improve our services. Please take a few minutes to complete the written survey that you may receive in the mail after your visit with us. Thank you!             Your Updated Medication List - Protect others around you: Learn how to safely use, store and throw away your medicines at www.disposemymeds.org.          This list is accurate as of: 6/30/17  8:44 AM.  Always use your most recent med list.                   Brand Name Dispense Instructions for use Diagnosis    IRON SUPPLEMENT PO      Take by mouth daily        Prenatal Vitamin 27-0.8 MG Tabs     90 tablet    Take 1 tablet by mouth daily    Pregnancy test positive

## 2017-07-24 ENCOUNTER — RADIANT APPOINTMENT (OUTPATIENT)
Dept: ULTRASOUND IMAGING | Facility: CLINIC | Age: 27
End: 2017-07-24
Attending: OBSTETRICS & GYNECOLOGY
Payer: COMMERCIAL

## 2017-07-24 DIAGNOSIS — O30.092 TWIN GESTATION, UNABLE TO DETERMINE NUMBER OF PLACENTA AND NUMBER OF AMNIOTIC SACS IN SECOND TRIMESTER: ICD-10-CM

## 2017-07-24 PROCEDURE — 76805 OB US >/= 14 WKS SNGL FETUS: CPT

## 2017-07-24 PROCEDURE — 76810 OB US >/= 14 WKS ADDL FETUS: CPT

## 2017-07-26 ENCOUNTER — PRENATAL OFFICE VISIT (OUTPATIENT)
Dept: OBGYN | Facility: CLINIC | Age: 27
End: 2017-07-26
Payer: COMMERCIAL

## 2017-07-26 VITALS
BODY MASS INDEX: 39.11 KG/M2 | OXYGEN SATURATION: 99 % | WEIGHT: 261 LBS | DIASTOLIC BLOOD PRESSURE: 78 MMHG | SYSTOLIC BLOOD PRESSURE: 134 MMHG | HEART RATE: 95 BPM

## 2017-07-26 DIAGNOSIS — O30.042 DICHORIONIC DIAMNIOTIC TWIN PREGNANCY IN SECOND TRIMESTER: Primary | ICD-10-CM

## 2017-07-26 PROBLEM — O30.049 DICHORIONIC DIAMNIOTIC TWIN GESTATION: Status: ACTIVE | Noted: 2017-06-09

## 2017-07-26 PROCEDURE — 99207 ZZC PRENATAL VISIT: CPT | Performed by: OBSTETRICS & GYNECOLOGY

## 2017-07-26 NOTE — NURSING NOTE
"Chief Complaint   Patient presents with     Prenatal Care     19.6 weeks       Initial /78 (BP Location: Right arm, Cuff Size: Adult Regular)  Pulse 95  Wt 261 lb (118.4 kg)  LMP 03/09/2017 (Exact Date)  SpO2 99%  BMI 39.11 kg/m2 Estimated body mass index is 39.11 kg/(m^2) as calculated from the following:    Height as of 4/18/17: 5' 8.5\" (1.74 m).    Weight as of this encounter: 261 lb (118.4 kg).  Medication Reconciliation: complete   JORDAN Gilliland 7/26/2017         "

## 2017-07-26 NOTE — MR AVS SNAPSHOT
After Visit Summary   7/26/2017    Syed Orantes    MRN: [id number]           Patient Information     Date Of Birth          1990        Visit Information        Provider Department      7/26/2017 8:00 AM Eduardo Arthur MD [facility] [place]        Today's Diagnoses     Dichorionic diamniotic twin pregnancy in second trimester    -  1       Follow-ups after your visit        Follow-up notes from your care team     Return in about 4 weeks (around 8/23/2017) for prenatal visit.      Your next 10 appointments already scheduled     Aug 23, 2017  8:15 AM CDT   ESTABLISHED PRENATAL with Eduardo Arthur MD   [facility] [place] ([facility])    [address] [place] [id number] [phone]              Future tests that were ordered for you today     Open Future Orders        Priority Expected Expires Ordered    US OB Single Follow Up Repeat Routine  10/24/2017 7/26/2017            Who to contact     If you have questions or need follow up information about today's clinic visit or your schedule please contact [facility] directly at [phone].  Normal or non-critical lab and imaging results will be communicated to you by MyChart, letter or phone within 4 business days after the clinic has received the results. If you do not hear from us within 7 days, please contact the clinic through Advasensehart or phone. If you have a critical or abnormal lab result, we will notify you by phone as soon as possible.  Submit refill requests through [employer] or call your pharmacy and they will forward the refill request to us. Please allow 3 business days for your refill to be completed.          Additional Information About Your Visit        Advasensehart Information     [employer] gives you secure access to your electronic health record. If you see a primary care provider, you can also send messages to your care team and make appointments. If you have  questions, please call your primary care clinic.  If you do not have a primary care provider, please call [phone] and they will assist you.        Care EveryWhere ID     This is your Care EveryWhere ID. This could be used by other organizations to access your [place] medical records  [id number]        Your Vitals Were     Pulse Last Period Pulse Oximetry BMI (Body Mass Index)          95 03/09/2017 (Exact Date) 99% 39.11 kg/m2         Blood Pressure from Last 3 Encounters:   07/26/17 134/78   06/30/17 131/77   06/09/17 122/79    Weight from Last 3 Encounters:   07/26/17 261 lb (118.4 kg)   06/30/17 255 lb 3.2 oz (115.8 kg)   06/09/17 254 lb (115.2 kg)               Primary Care Provider Office Phone # Fax #    Gianna Price [phone]       [address] [address]        Equal Access to Services     MORRIS KNUTSON AH: Hadii aad ku hadasho Soomaali, waaxda luqadaha, qaybta kaalmada adeegyada, waxay idiin hayangeln elma krishnamurthy . So [facility] [phone].    ATENCIÓN: Si habla español, tiene a wise disposición servicios gratuitos de asistencia lingüística. LlKettering Health [facility] [phone].    We comply with applicable federal civil rights laws and [place] laws. We do not discriminate on the basis of race, color, national origin, age, disability sex, sexual orientation or gender identity.            Thank you!     Thank you for choosing [facility]  for your care. Our goal is always to provide you with excellent care. Hearing back from our patients is one way we can continue to improve our services. Please take a few minutes to complete the written survey that you may receive in the mail after your visit with us. Thank you!             Your Updated Medication List - Protect others around you: Learn how to safely use, store and throw away your medicines at www.disposemymeds.org.          This list is accurate as of: 7/26/17  8:32 AM.  Always use your most  recent med list.                   Brand Name Dispense Instructions for use Diagnosis    IRON SUPPLEMENT PO      Take by mouth daily        Prenatal Vitamin 27-0.8 MG Tabs     90 tablet    Take 1 tablet by mouth daily    Pregnancy test positive

## 2017-07-26 NOTE — PROGRESS NOTES
19w6d. Doing well without issues/concerns.    Quad screen not done per pt request  Routine anticipatory guidance.    Repeat ultrasound ordered.   Eduardo Arthur MD

## 2017-08-16 ENCOUNTER — RADIANT APPOINTMENT (OUTPATIENT)
Dept: ULTRASOUND IMAGING | Facility: CLINIC | Age: 27
End: 2017-08-16
Attending: OBSTETRICS & GYNECOLOGY
Payer: COMMERCIAL

## 2017-08-16 DIAGNOSIS — O30.042 DICHORIONIC DIAMNIOTIC TWIN PREGNANCY IN SECOND TRIMESTER: ICD-10-CM

## 2017-08-16 PROCEDURE — 76816 OB US FOLLOW-UP PER FETUS: CPT

## 2017-08-23 ENCOUNTER — PRENATAL OFFICE VISIT (OUTPATIENT)
Dept: OBGYN | Facility: CLINIC | Age: 27
End: 2017-08-23
Payer: COMMERCIAL

## 2017-08-23 VITALS
DIASTOLIC BLOOD PRESSURE: 73 MMHG | HEART RATE: 89 BPM | SYSTOLIC BLOOD PRESSURE: 118 MMHG | WEIGHT: 265 LBS | OXYGEN SATURATION: 97 % | BODY MASS INDEX: 39.71 KG/M2

## 2017-08-23 DIAGNOSIS — O30.002 DISCORDANT FETAL GROWTH IN TWIN GESTATION, SECOND TRIMESTER: ICD-10-CM

## 2017-08-23 DIAGNOSIS — O30.042 DICHORIONIC DIAMNIOTIC TWIN PREGNANCY IN SECOND TRIMESTER: Primary | ICD-10-CM

## 2017-08-23 DIAGNOSIS — O30.042 DICHORIONIC DIAMNIOTIC TWIN PREGNANCY IN SECOND TRIMESTER: ICD-10-CM

## 2017-08-23 DIAGNOSIS — O26.892 BACK PAIN AFFECTING PREGNANCY, SECOND TRIMESTER: ICD-10-CM

## 2017-08-23 DIAGNOSIS — O09.02 PREGNANCY WITH HISTORY OF INFERTILITY, SECOND TRIMESTER: Primary | ICD-10-CM

## 2017-08-23 DIAGNOSIS — O09.02 PREGNANCY WITH HISTORY OF INFERTILITY, SECOND TRIMESTER: ICD-10-CM

## 2017-08-23 DIAGNOSIS — O36.5920 DISCORDANT FETAL GROWTH IN TWIN GESTATION, SECOND TRIMESTER: ICD-10-CM

## 2017-08-23 LAB
GLUCOSE 1H P 50 G GLC PO SERPL-MCNC: 107 MG/DL (ref 60–129)
HGB BLD-MCNC: 10 G/DL (ref 11.7–15.7)

## 2017-08-23 PROCEDURE — 99207 ZZC PRENATAL VISIT: CPT | Performed by: OBSTETRICS & GYNECOLOGY

## 2017-08-23 NOTE — NURSING NOTE
"Chief Complaint   Patient presents with     Prenatal Care     23.6 weeks       Initial /73 (BP Location: Right arm, Cuff Size: Adult Large)  Pulse 89  Wt 265 lb (120.2 kg)  LMP 03/09/2017 (Exact Date)  SpO2 97%  BMI 39.71 kg/m2 Estimated body mass index is 39.71 kg/(m^2) as calculated from the following:    Height as of 4/18/17: 5' 8.5\" (1.74 m).    Weight as of this encounter: 265 lb (120.2 kg).  Medication Reconciliation: complete   JORDAN Gilliland 8/23/2017         "

## 2017-08-23 NOTE — MR AVS SNAPSHOT
After Visit Summary   8/23/2017    Syed Orantes    MRN: [id number]           Patient Information     Date Of Birth          1990        Visit Information        Provider Department      8/23/2017 8:15 AM Eduardo Arthur MD [facility]        Today's Diagnoses     Pregnancy with history of infertility, second trimester    -  1    Dichorionic diamniotic twin pregnancy in second trimester           Follow-ups after your visit        Your next 10 appointments already scheduled     Aug 23, 2017  8:15 AM CDT   ESTABLISHED PRENATAL with Eduardo Arthur MD   [facility]    [address] [place] [address]  [place] [address]   [phone]            Sep 13, 2017  9:00 AM CDT   ESTABLISHED PRENATAL with Eduardo Arthur MD   [facility] ([facility])    [address] [place] [address]  [place] [address]   [phone]              Future tests that were ordered for you today     Open Future Orders        Priority Expected Expires Ordered    Glucose tolerance gest screen 1 hour Routine 8/23/2017 10/23/2017 8/23/2017    OB hemoglobin Routine 8/23/2017 10/23/2017 8/23/2017            Who to contact     If you have questions or need follow up information about today's clinic visit or your schedule please contact [facility] [place] directly at [phone].  Normal or non-critical lab and imaging results will be communicated to you by MyChart, letter or phone within 4 business days after the clinic has received the results. If you do not hear from us within 7 days, please contact the clinic through MyChart or phone. If you have a critical or abnormal lab result, we will notify you by phone as soon as possible.  Submit refill requests through UrgentRx or call your pharmacy and they will forward the refill request to us. Please allow 3 business days for your refill to be completed.          Additional Information  About Your Visit        PharmRight Corphart Information     Minube gives you secure access to your electronic health record. If you see a primary care provider, you can also send messages to your care team and make appointments. If you have questions, please call your primary care clinic.  If you do not have a primary care provider, please call [phone] and they will assist you.        Care EveryWhere ID     This is your Care EveryWhere ID. This could be used by other organizations to access your [place] medical records  [id number]        Your Vitals Were     Pulse Last Period Pulse Oximetry BMI (Body Mass Index)          89 03/09/2017 (Exact Date) 97% 39.71 kg/m2         Blood Pressure from Last 3 Encounters:   08/23/17 118/73   07/26/17 134/78   06/30/17 131/77    Weight from Last 3 Encounters:   08/23/17 265 lb (120.2 kg)   07/26/17 261 lb (118.4 kg)   06/30/17 255 lb 3.2 oz (115.8 kg)               Primary Care Provider Office Phone # Fax #    Gianna Price [phone] [address] [facility] [address]        Equal Access to Services     [facility] AH: Hadii aad ku hadasho Soomaali, waaxda luqadaha, qaybta kaalmada adeegyada, boyd wattsn elma padgettn ah. So [facility] [phone].    ATENCIÓN: Si habla español, tiene a wise disposición servicios gratuitos de asistencia lingüística. [facility] [phone].    We comply with applicable federal civil rights laws and [place] laws. We do not discriminate on the basis of race, color, national origin, age, disability sex, sexual orientation or gender identity.            Thank you!     Thank you for choosing [facility]  for your care. Our goal is always to provide you with excellent care. Hearing back from our patients is one way we can continue to improve our services. Please take a few minutes to complete the written survey that you may receive in the mail after your visit with us. Thank you!             Your Updated  Medication List - Protect others around you: Learn how to safely use, store and throw away your medicines at www.disposemymeds.org.          This list is accurate as of: 8/23/17  8:12 AM.  Always use your most recent med list.                   Brand Name Dispense Instructions for use Diagnosis    IRON SUPPLEMENT PO      Take by mouth daily        Prenatal Vitamin 27-0.8 MG Tabs     90 tablet    Take 1 tablet by mouth daily    Pregnancy test positive

## 2017-08-23 NOTE — PROGRESS NOTES
23w6d    Doing well without issues/concerns.    Routine anticipatory guidance.    US was reviewed and twin B has size discrepancy from past ultrasound.  Will consult with MFM.    She would like to do the labs today.   RTC 4 wk.    Eduardo Arthur MD

## 2017-08-30 ENCOUNTER — PRE VISIT (OUTPATIENT)
Dept: MATERNAL FETAL MEDICINE | Facility: CLINIC | Age: 27
End: 2017-08-30

## 2017-09-01 ENCOUNTER — OFFICE VISIT (OUTPATIENT)
Dept: MATERNAL FETAL MEDICINE | Facility: CLINIC | Age: 27
End: 2017-09-01
Attending: OBSTETRICS & GYNECOLOGY
Payer: COMMERCIAL

## 2017-09-01 ENCOUNTER — HOSPITAL ENCOUNTER (OUTPATIENT)
Dept: ULTRASOUND IMAGING | Facility: CLINIC | Age: 27
Discharge: HOME | End: 2017-09-01
Attending: OBSTETRICS & GYNECOLOGY | Admitting: OBSTETRICS & GYNECOLOGY
Payer: COMMERCIAL

## 2017-09-01 ENCOUNTER — TELEPHONE (OUTPATIENT)
Dept: OBGYN | Facility: CLINIC | Age: 27
End: 2017-09-01

## 2017-09-01 ENCOUNTER — HOSPITAL ENCOUNTER (OUTPATIENT)
Facility: CLINIC | Age: 27
Setting detail: OBSERVATION
Discharge: HOME | End: 2017-09-03
Attending: OBSTETRICS & GYNECOLOGY | Admitting: OBSTETRICS & GYNECOLOGY
Payer: COMMERCIAL

## 2017-09-01 DIAGNOSIS — O30.002 DISCORDANT FETAL GROWTH IN TWIN GESTATION, SECOND TRIMESTER: ICD-10-CM

## 2017-09-01 DIAGNOSIS — O36.5920 DISCORDANT FETAL GROWTH IN TWIN GESTATION, SECOND TRIMESTER: ICD-10-CM

## 2017-09-01 DIAGNOSIS — O30.049 DICHORIONIC DIAMNIOTIC TWIN PREGNANCY, ANTEPARTUM: ICD-10-CM

## 2017-09-01 DIAGNOSIS — O34.32: Primary | ICD-10-CM

## 2017-09-01 DIAGNOSIS — O30.042 DICHORIONIC DIAMNIOTIC TWIN PREGNANCY IN SECOND TRIMESTER: ICD-10-CM

## 2017-09-01 DIAGNOSIS — B96.89 BV (BACTERIAL VAGINOSIS): Primary | ICD-10-CM

## 2017-09-01 DIAGNOSIS — N76.0 BV (BACTERIAL VAGINOSIS): Primary | ICD-10-CM

## 2017-09-01 LAB
ABO + RH BLD: NORMAL
ABO + RH BLD: NORMAL
ALBUMIN UR-MCNC: NEGATIVE MG/DL
APPEARANCE UR: CLEAR
BACTERIA #/AREA URNS HPF: ABNORMAL /HPF
BASOPHILS # BLD AUTO: 0 10E9/L (ref 0–0.2)
BASOPHILS NFR BLD AUTO: 0.2 %
BILIRUB UR QL STRIP: NEGATIVE
BLD GP AB SCN SERPL QL: NORMAL
BLOOD BANK CMNT PATIENT-IMP: NORMAL
COLOR UR AUTO: YELLOW
DIFFERENTIAL METHOD BLD: ABNORMAL
EOSINOPHIL # BLD AUTO: 0.1 10E9/L (ref 0–0.7)
EOSINOPHIL NFR BLD AUTO: 1.3 %
ERYTHROCYTE [DISTWIDTH] IN BLOOD BY AUTOMATED COUNT: 16.2 % (ref 10–15)
GLUCOSE UR STRIP-MCNC: NEGATIVE MG/DL
HCT VFR BLD AUTO: 32 % (ref 35–47)
HGB BLD-MCNC: 10.6 G/DL (ref 11.7–15.7)
HGB UR QL STRIP: NEGATIVE
IMM GRANULOCYTES # BLD: 0.3 10E9/L (ref 0–0.4)
IMM GRANULOCYTES NFR BLD: 2.8 %
KETONES UR STRIP-MCNC: 40 MG/DL
LEUKOCYTE ESTERASE UR QL STRIP: NEGATIVE
LYMPHOCYTES # BLD AUTO: 1.9 10E9/L (ref 0.8–5.3)
LYMPHOCYTES NFR BLD AUTO: 17.7 %
MCH RBC QN AUTO: 28.5 PG (ref 26.5–33)
MCHC RBC AUTO-ENTMCNC: 33.1 G/DL (ref 31.5–36.5)
MCV RBC AUTO: 86 FL (ref 78–100)
MONOCYTES # BLD AUTO: 0.6 10E9/L (ref 0–1.3)
MONOCYTES NFR BLD AUTO: 5.5 %
MUCOUS THREADS #/AREA URNS LPF: PRESENT /LPF
NEUTROPHILS # BLD AUTO: 7.9 10E9/L (ref 1.6–8.3)
NEUTROPHILS NFR BLD AUTO: 72.5 %
NITRATE UR QL: NEGATIVE
NRBC # BLD AUTO: 0 10*3/UL
NRBC BLD AUTO-RTO: 0 /100
PH UR STRIP: 6 PH (ref 5–7)
PLATELET # BLD AUTO: 262 10E9/L (ref 150–450)
RBC # BLD AUTO: 3.72 10E12/L (ref 3.8–5.2)
RBC #/AREA URNS AUTO: 1 /HPF (ref 0–2)
SOURCE: ABNORMAL
SP GR UR STRIP: 1.01 (ref 1–1.03)
SPECIMEN EXP DATE BLD: NORMAL
SPECIMEN SOURCE: ABNORMAL
SQUAMOUS #/AREA URNS AUTO: <1 /HPF (ref 0–1)
UROBILINOGEN UR STRIP-MCNC: 0.2 MG/DL (ref 0–2)
WBC # BLD AUTO: 10.8 10E9/L (ref 4–11)
WBC #/AREA URNS AUTO: 1 /HPF (ref 0–2)
WET PREP SPEC: ABNORMAL

## 2017-09-01 PROCEDURE — 99214 OFFICE O/P EST MOD 30 MIN: CPT

## 2017-09-01 PROCEDURE — 81001 URINALYSIS AUTO W/SCOPE: CPT | Performed by: OBSTETRICS & GYNECOLOGY

## 2017-09-01 PROCEDURE — 99215 OFFICE O/P EST HI 40 MIN: CPT

## 2017-09-01 PROCEDURE — 86780 TREPONEMA PALLIDUM: CPT | Performed by: OBSTETRICS & GYNECOLOGY

## 2017-09-01 PROCEDURE — 99218 ZZC INITIAL OBSERVATION CARE,LEVL I: CPT | Mod: 25 | Performed by: OBSTETRICS & GYNECOLOGY

## 2017-09-01 PROCEDURE — 76817 TRANSVAGINAL US OBSTETRIC: CPT

## 2017-09-01 PROCEDURE — 25000132 ZZH RX MED GY IP 250 OP 250 PS 637: Performed by: OBSTETRICS & GYNECOLOGY

## 2017-09-01 PROCEDURE — 86900 BLOOD TYPING SEROLOGIC ABO: CPT | Performed by: OBSTETRICS & GYNECOLOGY

## 2017-09-01 PROCEDURE — 87653 STREP B DNA AMP PROBE: CPT | Performed by: OBSTETRICS & GYNECOLOGY

## 2017-09-01 PROCEDURE — 59025 FETAL NON-STRESS TEST: CPT | Mod: 26 | Performed by: OBSTETRICS & GYNECOLOGY

## 2017-09-01 PROCEDURE — 86850 RBC ANTIBODY SCREEN: CPT | Performed by: OBSTETRICS & GYNECOLOGY

## 2017-09-01 PROCEDURE — 96366 THER/PROPH/DIAG IV INF ADDON: CPT

## 2017-09-01 PROCEDURE — 87186 SC STD MICRODIL/AGAR DIL: CPT | Performed by: OBSTETRICS & GYNECOLOGY

## 2017-09-01 PROCEDURE — 40000068 ZZH STATISTIC EVAL-PTS ADMITTED TO OTHER DEPTS: Mod: ZF

## 2017-09-01 PROCEDURE — 96376 TX/PRO/DX INJ SAME DRUG ADON: CPT

## 2017-09-01 PROCEDURE — 85025 COMPLETE CBC W/AUTO DIFF WBC: CPT | Performed by: OBSTETRICS & GYNECOLOGY

## 2017-09-01 PROCEDURE — 86901 BLOOD TYPING SEROLOGIC RH(D): CPT | Performed by: OBSTETRICS & GYNECOLOGY

## 2017-09-01 PROCEDURE — 25000128 H RX IP 250 OP 636: Performed by: OBSTETRICS & GYNECOLOGY

## 2017-09-01 PROCEDURE — 96365 THER/PROPH/DIAG IV INF INIT: CPT

## 2017-09-01 PROCEDURE — 80307 DRUG TEST PRSMV CHEM ANLYZR: CPT | Performed by: OBSTETRICS & GYNECOLOGY

## 2017-09-01 PROCEDURE — 96372 THER/PROPH/DIAG INJ SC/IM: CPT

## 2017-09-01 PROCEDURE — 76812 OB US DETAILED ADDL FETUS: CPT

## 2017-09-01 PROCEDURE — 96361 HYDRATE IV INFUSION ADD-ON: CPT

## 2017-09-01 PROCEDURE — 87491 CHLMYD TRACH DNA AMP PROBE: CPT | Performed by: OBSTETRICS & GYNECOLOGY

## 2017-09-01 PROCEDURE — 87210 SMEAR WET MOUNT SALINE/INK: CPT | Performed by: OBSTETRICS & GYNECOLOGY

## 2017-09-01 PROCEDURE — G0378 HOSPITAL OBSERVATION PER HR: HCPCS

## 2017-09-01 PROCEDURE — 87591 N.GONORRHOEAE DNA AMP PROB: CPT | Performed by: OBSTETRICS & GYNECOLOGY

## 2017-09-01 RX ORDER — LIDOCAINE 40 MG/G
CREAM TOPICAL
Status: DISCONTINUED | OUTPATIENT
Start: 2017-09-01 | End: 2017-09-03 | Stop reason: HOSPADM

## 2017-09-01 RX ORDER — METRONIDAZOLE 500 MG/1
500 TABLET ORAL EVERY 12 HOURS SCHEDULED
Status: DISCONTINUED | OUTPATIENT
Start: 2017-09-01 | End: 2017-09-03 | Stop reason: HOSPADM

## 2017-09-01 RX ORDER — SODIUM CHLORIDE, SODIUM LACTATE, POTASSIUM CHLORIDE, CALCIUM CHLORIDE 600; 310; 30; 20 MG/100ML; MG/100ML; MG/100ML; MG/100ML
INJECTION, SOLUTION INTRAVENOUS CONTINUOUS
Status: DISCONTINUED | OUTPATIENT
Start: 2017-09-01 | End: 2017-09-03 | Stop reason: HOSPADM

## 2017-09-01 RX ORDER — ONDANSETRON 2 MG/ML
4 INJECTION INTRAMUSCULAR; INTRAVENOUS EVERY 6 HOURS PRN
Status: DISCONTINUED | OUTPATIENT
Start: 2017-09-01 | End: 2017-09-03 | Stop reason: HOSPADM

## 2017-09-01 RX ORDER — METRONIDAZOLE 250 MG/1
250 TABLET ORAL 3 TIMES DAILY
Status: DISCONTINUED | OUTPATIENT
Start: 2017-09-01 | End: 2017-09-01

## 2017-09-01 RX ORDER — MAGNESIUM SULFATE IN WATER 40 MG/ML
2 INJECTION, SOLUTION INTRAVENOUS CONTINUOUS
Status: DISCONTINUED | OUTPATIENT
Start: 2017-09-01 | End: 2017-09-03 | Stop reason: HOSPADM

## 2017-09-01 RX ORDER — METRONIDAZOLE 500 MG/1
500 TABLET ORAL EVERY 12 HOURS SCHEDULED
Status: DISCONTINUED | OUTPATIENT
Start: 2017-09-01 | End: 2017-09-01

## 2017-09-01 RX ORDER — CALCIUM GLUCONATE 94 MG/ML
1 INJECTION, SOLUTION INTRAVENOUS
Status: DISCONTINUED | OUTPATIENT
Start: 2017-09-01 | End: 2017-09-03 | Stop reason: HOSPADM

## 2017-09-01 RX ORDER — BETAMETHASONE SODIUM PHOSPHATE AND BETAMETHASONE ACETATE 3; 3 MG/ML; MG/ML
12 INJECTION, SUSPENSION INTRA-ARTICULAR; INTRALESIONAL; INTRAMUSCULAR; SOFT TISSUE EVERY 24 HOURS
Status: COMPLETED | OUTPATIENT
Start: 2017-09-01 | End: 2017-09-02

## 2017-09-01 RX ORDER — ACETAMINOPHEN 325 MG/1
325-975 TABLET ORAL EVERY 4 HOURS PRN
Status: DISCONTINUED | OUTPATIENT
Start: 2017-09-01 | End: 2017-09-03 | Stop reason: HOSPADM

## 2017-09-01 RX ADMIN — MAGNESIUM SULFATE IN WATER 2 G/HR: 40 INJECTION, SOLUTION INTRAVENOUS at 17:59

## 2017-09-01 RX ADMIN — BETAMETHASONE SODIUM PHOSPHATE AND BETAMETHASONE ACETATE 12 MG: 3; 3 INJECTION, SUSPENSION INTRA-ARTICULAR; INTRALESIONAL; INTRAMUSCULAR at 17:22

## 2017-09-01 RX ADMIN — SODIUM CHLORIDE, POTASSIUM CHLORIDE, SODIUM LACTATE AND CALCIUM CHLORIDE: 600; 310; 30; 20 INJECTION, SOLUTION INTRAVENOUS at 17:30

## 2017-09-01 RX ADMIN — SODIUM CHLORIDE, POTASSIUM CHLORIDE, SODIUM LACTATE AND CALCIUM CHLORIDE: 600; 310; 30; 20 INJECTION, SOLUTION INTRAVENOUS at 16:40

## 2017-09-01 RX ADMIN — MAGNESIUM SULFATE IN WATER 6 G: 40 INJECTION, SOLUTION INTRAVENOUS at 17:25

## 2017-09-01 RX ADMIN — METRONIDAZOLE 500 MG: 500 TABLET ORAL at 20:00

## 2017-09-01 NOTE — MR AVS SNAPSHOT
After Visit Summary   9/1/2017    Syed Orantes    MRN: [id number]           Patient Information     Date Of Birth          1990        Visit Information        Provider Department      9/1/2017 2:45 PM Tessa Syed MD [facility] Maternal Fetal Medicine [facility]        Today's Diagnoses     Cervical funneling affecting pregnancy, antepartum, second trimester    -  1    Dichorionic diamniotic twin pregnancy, antepartum           Follow-ups after your visit        Your next 10 appointments already scheduled     Sep 13, 2017  9:00 AM CDT   [facility]    [address] [place] [id number]   [phone]              Future tests that were ordered for you today     Open Standing Orders        Priority Remaining Interval Expires Ordered    Magnesium Level STAT 1/1 CONDITIONAL X 1 STAT  9/1/2017          Open Future Orders        Priority Expected Expires Ordered    MFM US Comprehensive Single F/U Routine 9/29/2017 9/1/2018 9/1/2017            Who to contact     If you have questions or need follow up information about today's clinic visit or your schedule please contact [facility] MATERNAL FETAL MEDICINE [facility] directly at [phone].  Normal or non-critical lab and imaging results will be communicated to you by MyChart, letter or phone within 4 business days after the clinic has received the results. If you do not hear from us within 7 days, please contact the clinic through [employer] or phone. If you have a critical or abnormal lab result, we will notify you by phone as soon as possible.  Submit refill requests through [employer] or call your pharmacy and they will forward the refill request to us. Please allow 3 business days for your refill to be completed.          Additional Information About Your Visit        [employer] Information     [employer] gives you secure access to your  electronic health record. If you see a primary care provider, you can also send messages to your care team and make appointments. If you have questions, please call your primary care clinic.  If you do not have a primary care provider, please call [phone] and they will assist you.        Care EveryWhere ID     This is your Care EveryWhere ID. This could be used by other organizations to access your [place] medical records  [id number]        Your Vitals Were     Last Period                   03/09/2017 (Exact Date)            Blood Pressure from Last 3 Encounters:   08/23/17 118/73   07/26/17 134/78   06/30/17 131/77    Weight from Last 3 Encounters:   08/23/17 120.2 kg (265 lb)   07/26/17 118.4 kg (261 lb)   06/30/17 115.8 kg (255 lb 3.2 oz)              We Performed the Following     Chlamydia PCR     Gonorrhea PCR     Group B strep PCR     Wet prep        Primary Care Provider Office Phone # Fax #    Gianna Price [phone]       [address] [place] [address]        Equal Access to Services     [facility]: Hadii aad ku hadasho Soomaali, waaxda luqadaha, qaybta kaalmada peggy, boyd krishnamurthy . So [facility] [phone].    ATENCIÓN: Si habla español, tiene a wise disposición servicios gratuitos de asistencia lingüística. [facility] [phone].    We comply with applicable federal civil rights laws and [place] laws. We do not discriminate on the basis of race, color, national origin, age, disability sex, sexual orientation or gender identity.            Thank you!     Thank you for choosing [facility]ER[facility]ETAL [facility]IC[facility]  for your care. Our goal is always to provide you with excellent care. Hearing back from our patients is one way we can continue to improve our services. Please take a few minutes to complete the written survey that you may receive in the mail after your visit with us. Thank you!             Your Updated Medication List -  Protect others around you: Learn how to safely use, store and throw away your medicines at www.disposemymeds.org.          This list is accurate as of: 9/1/17  3:20 PM.  Always use your most recent med list.                   Brand Name Dispense Instructions for use Diagnosis    IRON SUPPLEMENT PO      Take by mouth daily        order for DME     1 Device    One pregnancy belt.    Dichorionic diamniotic twin pregnancy in second trimester, Back pain affecting pregnancy, second trimester       Prenatal Vitamin 27-0.8 MG Tabs     90 tablet    Take 1 tablet by mouth daily    Pregnancy test positive

## 2017-09-01 NOTE — IP AVS SNAPSHOT
[facility]    [address]    [place] [address]    Phone:  [phone]                                       After Visit Summary   9/1/2017    Syed Orantes    MRN: [id number]           After Visit Summary Signature Page     I have received my discharge instructions, and my questions have been answered. I have discussed any challenges I see with this plan with the nurse or doctor.    ..........................................................................................................................................  Patient/Patient Representative Signature      ..........................................................................................................................................  Patient Representative Print Name and Relationship to Patient    ..................................................               ................................................  Date                                            Time    ..........................................................................................................................................  Reviewed by Signature/Title    ...................................................              ..............................................  Date                                                            Time

## 2017-09-01 NOTE — TELEPHONE ENCOUNTER
Reason for Call:  Other FYI    Detailed comments: Loli called from  hosp.  Dr Syed sent pt to [facility] to rule out  labor.  Dr. Lazo is taking over her care.  If you have questions, please call [phone]   Thank you!     Phone Number Patient can be reached at: Home number on file [phone] (home)    Best Time: any    Can we leave a detailed message on this number? Not Applicable    Call taken on 2017 at 3:51 PM by Jacquelyn Kenney

## 2017-09-01 NOTE — IP AVS SNAPSHOT
MRN:[id number]                      After Visit Summary   9/1/2017    Syed Orantes    MRN: [id number]           Thank you!     Thank you for choosing [facility] for your care. Our goal is always to provide you with excellent care. Hearing back from our patients is one way we can continue to improve our services. Please take a few minutes to complete the written survey that you may receive in the mail after you visit with us. Thank you!        Patient Information     Date Of Birth          1990        Designated Caregiver       Most Recent Value    Caregiver    Will someone help with your care after discharge? yes    Name of designated caregiver Shaquille Orantes    Phone number of caregiver [phone]    Caregiver address Same as pt      About your hospital stay     You were admitted on:  September 1, 2017 You last received care in the:  [facility]    You were discharged on:  September 3, 2017       Who to Call     For medical emergencies, please call 911.  For non-urgent questions about your medical care, please call your primary care provider or clinic, [phone]          Attending Provider     Provider Specialty    Alecia Lazo MD OB/Gyn       Primary Care Provider Office Phone # Fax #    Gianna Price [phone]      Your next 10 appointments already scheduled     Sep 13, 2017  9:00 AM CDT   ESTABLISHED PRENATAL with Eduardo Arthur MD   [facility] ([address] [address] [phone]              Further instructions from your care team       Discharge Instruction for Undelivered Patients      You were seen for: Labor Assessment  We Consulted: Dr. Syed and Dr. Lazo  You had (Test or Medicine):monitoring, betamethasone, and cervical check     Diet:   Drink 8 to 12 glasses of liquids (milk, juice, water) every day.  You may eat meals and snacks.     Activity:  Call your doctor or  nurse midwife if your baby is moving less than usual.     Call your provider if you notice:  Swelling in your face or increased swelling in your hands or legs.  Headaches that are not relieved by Tylenol (acetaminophen).  Changes in your vision (blurring: seeing spots or stars.)  Nausea (sick to your stomach) and vomiting (throwing up).   Weight gain of 5 pounds or more per week.  Heartburn that doesn't go away.  Signs of bladder infection: pain when you urinate (use the toilet), need to go more often and more urgently.  The bag of warner (rupture of membranes) breaks, or you notice leaking in your underwear.  Bright red blood in your underwear.  Abdominal (lower belly) or stomach pain.  For first baby: Contractions (tightening) less than 5 minutes apart for one hour or more.  Second (plus) baby: Contractions (tightening) less than 10 minutes apart and getting stronger.  *If less than 34 weeks: Contractions (tightenings) more than 6 times in one hour.  Increase or change in vaginal discharge (note the color and amount)      Follow-up:  As scheduled in the clinic      Need to be seen in clinic later this week.  Growth ultrasound in 3-4 weeks at [facility]    Pending Results     Date and Time Order Name Status Description    9/1/2017 1503 NEISSERIA GONORRHOEAE PCR In process     9/1/2017 1503 CHLAMYDIA TRACHOMATIS PCR In process     9/1/2017 1422 REFERRAL SENSITIVITY In process             Statement of Approval     Ordered          09/03/17 1010  I have reviewed and agree with all the recommendations and orders detailed in this document.  EFFECTIVE NOW     Approved and electronically signed by:  Alecia Lazo MD             Admission Information     Date & Time Provider Department Dept. Phone    9/1/2017 Alecia Lazo MD [facility] [phone]      Your Vitals Were     Blood Pressure Pulse Temperature Respirations Last Period       127/57 88 97.7  F (36.5  C) (Oral) 16 03/09/2017 (Exact Date)       Mayuri  Information     [employer] gives you secure access to your electronic health record. If you see a primary care provider, you can also send messages to your care team and make appointments. If you have questions, please call your primary care clinic.  If you do not have a primary care provider, please call [phone] and they will assist you.        Care EveryWhere ID     This is your Care EveryWhere ID. This could be used by other organizations to access your [facility] medical records  [id number]        Equal Access to Services     MORRIS KNUTSON : Hadii gela ku hadasho Soomaali, waaxda luqadaha, qaybta kaalmada adeegyada, waxay phyllis hernandez. So [facility] [phone].    ATENCIÓN: Si habla español, tiene a wise disposición servicios gratuitos de asistencia lingüística. [facility] [phone].    We comply with applicable federal civil rights laws and [place] laws. We do not discriminate on the basis of race, color, national origin, age, disability sex, sexual orientation or gender identity.               Review of your medicines      START taking        Dose / Directions    metroNIDAZOLE 500 MG tablet   Commonly known as:  FLAGYL   Indication:  bacterial vaginosis   Used for:  BV (bacterial vaginosis)        Dose:  500 mg   Take 1 tablet (500 mg) by mouth every 12 hours for 5 days   Quantity:  10 tablet   Refills:  0         CONTINUE these medicines which have NOT CHANGED        Dose / Directions    IRON SUPPLEMENT PO        Take by mouth daily   Refills:  0       order for DME   Used for:  Dichorionic diamniotic twin pregnancy in second trimester, Back pain affecting pregnancy, second trimester        One pregnancy belt.   Quantity:  1 Device   Refills:  0       Prenatal Vitamin 27-0.8 MG Tabs   Used for:  Pregnancy test positive        Dose:  1 tablet   Take 1 tablet by mouth daily   Quantity:  90 tablet   Refills:  3            Where to get your medicines      These medications were sent to  [id number] - [place], [place] - [address] [facility] [address]     Phone:  [phone]     metroNIDAZOLE 500 MG tablet                Protect others around you: Learn how to safely use, store and throw away your medicines at www.disposemymeds.org.             Medication List: This is a list of all your medications and when to take them. Check marks below indicate your daily home schedule. Keep this list as a reference.      Medications           Morning Afternoon Evening Bedtime As Needed    IRON SUPPLEMENT PO   Take by mouth daily                                metroNIDAZOLE 500 MG tablet   Commonly known as:  FLAGYL   Take 1 tablet (500 mg) by mouth every 12 hours for 5 days   Last time this was given:  500 mg on 9/3/2017  8:42 AM                                order for DME   One pregnancy belt.                                Prenatal Vitamin 27-0.8 MG Tabs   Take 1 tablet by mouth daily

## 2017-09-01 NOTE — PLAN OF CARE
Data: Patient presented to [facility] at 1520.   Reason for maternal/fetal assessment per patient is Rule Out Labor  .  Patient is a . Prenatal record reviewed.      Obstetric History       T1      L1     SAB0   TAB0   Ectopic0   Multiple0   Live Births1       # Outcome Date GA Lbr Rush/2nd Weight Sex Delivery Anes PTL Lv   2 Current            1 Term 09 40w0d  3.175 kg (7 lb) M -SEC EPI  LISA      Name: Codeion      . Medical history:   Past Medical History:   Diagnosis Date     Cholelithiasis and acute cholecystitis with obstruction      Cholelithiasis without obstruction 2011     Personal history of asthma     her asthma went away after she became active in high school sports.    . Gestational Age 25w1d. VSS. Fetal movement present. Patient denies cramping, vaginal discharge, pelvic pressure, UTI symptoms, GI problems, bloody show, vaginal bleeding, edema, headache, visual disturbances, epigastric or URQ pain, abdominal pain, rupture of membranes. Support persons Shaquille is on his way.   Action: Verbal consent for EFM. Triage assessment completed. EFM applied upon arrival. Uterine assessment showed cx every 2-3minutes. Fetal assessment: Presumed adequate fetal oxygenation documented (see flow record).   Response: Dr. Munoz informed of patient's arrival. Plan per provider is to admit patient, start IV magnesium, fluid bolus, and IM betamethasone. Patient verbalized agreement with plan. Patient transferred to room 422 ambulatory, oriented to room and call light.

## 2017-09-01 NOTE — H&P
[facility]  OB History and Physical      Syed Orantes MRN# [id number]   Age: 27 year old YOB: 1990     CC: Cervical shortening and funneling of membranes    HPI:  Ms. Syed Orantes is a 27 year old  at 25w1d by LMP c/w 9w1d US, who is admitted after incidental finding of shortened cervix and membrane funneling on today's ultrasound.  This is a dichorionic diamniotic twin gestation.    She states that she has been feeling well.  She does report back pain (R>L). She denies any contractions/cramping, leakage of fluid, or vaginal bleeding. She confirms active fetal movement. She denies fevers/chills, headache, changes in vision, nausea/vomiting, chest pain, shortness of breath, RUQ pain, or other systemic symptoms. She has been hydrating well. Last ate this morning at 800 am.    Pregnancy Complications:  - Clomid pregnancy  - Dichorionic diamniotic twin gestation  - History of prior  section    Prenatal Labs:   Lab Results   Component Value Date    ABO B 2017    RH  Pos 2017    AS Neg 2017    HEPBANG Nonreactive 2017    CHPCRT  2013     Negative for C. trachomatis rRNA by transcription mediated amplification.   A negative result by transcription mediated amplification does not preclude the   presence of C. trachomatis infection because results are dependent on proper   and adequate collection, absence of inhibitors, and sufficient rRNA to be   detected.    GCPCRT  2013     Negative for N. gonorrhoeae rRNA by transcription mediated amplification.   A negative result by transcription mediated amplification does not preclude the   presence of N. gonorrhoeae infection because results are dependent on proper   and adequate collection, absence of inhibitors, and sufficient rRNA to be   detected.    TREPAB Negative 2017    HGB 10.0 (L) 2017       OB History  Obstetric History       T1      L1      SAB0   TAB0   Ectopic0   Multiple0   Live Births1       # Outcome Date GA Lbr Rush/2nd Weight Sex Delivery Anes PTL Lv   2 Current            1 Term 09 40w0d  3.175 kg (7 lb) M -SEC EPI  LISA      Name: Codeion          PMHx:   Past Medical History:   Diagnosis Date     Cholelithiasis and acute cholecystitis with obstruction      Cholelithiasis without obstruction 2011     Personal history of asthma     her asthma went away after she became active in high school sports.      PSHx:   Past Surgical History:   Procedure Laterality Date      SECTION       CHOLECYSTECTOMY, LAPOROSCOPIC       COSMETIC SURGERY      breast reduction      HC INSERT IMPLANTABLE CONTRACEPTIVE CAPSULE       HC INSERTION INTRAUTERINE DEVICE       HC REMOVAL IMPLATABLE CONTRACEPTIVE CAPSULE       HC REMOVE INTRAUTERINE DEVICE       Meds:   Prescriptions Prior to Admission   Medication Sig Dispense Refill Last Dose     Ferrous Sulfate (IRON SUPPLEMENT PO) Take by mouth daily   2017 at 2100     Prenatal Vit-Fe Fumarate-FA (PRENATAL VITAMIN) 27-0.8 MG TABS Take 1 tablet by mouth daily 90 tablet 3 2017 at 2100     order for DME One pregnancy belt. 1 Device 0      Allergies:    Allergies   Allergen Reactions     Amoxicillin Hives            Pcn [Penicillin G] Hives      FmHx:   Family History   Problem Relation Age of Onset     Hypertension Mother      Thyroid Disease Maternal Grandmother      Colon Cancer No family hx of      Breast Cancer No family hx of      SocHx: She denies any tobacco, alcohol, or other drug use during this pregnancy.    ROS:   Complete 10-point ROS negative except as noted in HPI.    Physical Exam:  Vit:   Patient Vitals for the past 4 hrs:   BP Temp Temp src Pulse   17 1602 135/69 97.8  F (36.6  C) Oral 88      Gen: Well-appearing, NAD, comfortable   CV: Regular rate and rhythm  Pulm: Ctab, normal respiratory effort  Abd: Soft, gravid, non-tender  Ext: trace LE edema  b/l    SSE:  Cervix visually closed (performed by Dr. Syed at [facility])  Cx: /-3    Pres:  Transverse/Transver by US today  EFW:  1Ib 11oz (45%ile) and 1Ib 13 oz (55%ile) by US today  Memb: intact              FHT:    A: Baseline 140, moderate variability, accelerations present, no decelerations    B: Baseline 140, moderat variability, accelerations present, no decelerations  [place]: Contractions q2-3 minutes    Labs:  Results for orders placed or performed in visit on 17 (from the past 24 hour(s))   Wet prep   Result Value Ref Range    Specimen Description Vagina     Wet Prep No Trichomonas seen     Wet Prep No yeast seen     Wet Prep Moderate  PMNs seen       Wet Prep Few  Clue cells seen   (A)      Assessment/Plan  Ms. Syed Orantes is a 27 year old  at 25w1d by LMP c/w 9w1d US, who is admitted after incidental finding of shortened cervix and membrane funneling on ultrasound. Her pregnancy is otherwise complicated by: dichorionic diamniotic twin gestation and history of prior  section.    - Cervical shortening/Membrane funneling/Concern for  labor   - Ultrasound findings notable for cervical length of 0.0 mm and funneling.   - SVE: /-3. [place]: with visible contractions q2-3 minutes   - Wet prep with clue cells, asymptomatic. GC/CT and GBS pending.   - Management:    - Complete evaluation for PTL - UA with reflex UCx and UDS ordered.    - Continuous tocometry. Periodic reassessment of cervix for further cervical dilation    - Initiation of betamethasone series to assist with fetal lung maturity    - Initiation of magnesium for neuroprotection    - NICU consultation for discussion of expectations at this gestational age    - Will obtain  section consent d/t current malposition. Plan for reassessment of fetal position if continues to labor    -Fetal Well Being   - Category I FHT x 2. Reactive and reassuring   - EFW by US : 1Ib 11oz (45%ile) and 1Ib 13 oz  (55%ile)   - Initiation of BMZ and magnesium.   - Continue EFM and [place]    The patient was discussed with Dr. Lazo who is in agreement with the treatment plan.    Lyn Munoz MD  OB/GYN Resident, PGY-3  9/1/2017 3:41 PM      Attestation:   This patient was seen and evaluated by me, separately from the house staff team. I have reviewed the note/plan above and agree.     Not feeling any contractions and prior term birth.  No measurable cervix on ultrasound today and contractions when arrived here so will do betamethasone course and mag through that then d/c mag.  Has BV on wet prep so will treat that as well.  Had discussed case with Dr. Syed with [facility] and she is on call this weekend.  Reviewed if stable and not leatha, then will be able to go home probably on [place] 9/3.  Questions answered.     Alecia Lazo MD

## 2017-09-01 NOTE — NURSING NOTE
"Patient here for Ultrasound to evaluate her di/di twin pregnancy. See imaging tab for results. Cervical funneling noted on US. Patient reports backache x 2 days and small amount of \"mucous\" discharge yesterday. Speculum exam performed by Dr. Syed. [facility]. Patient agrees to plan. L&D Charge RN and Dr. Lazo notified. [facility] transferred to [facility] via wheelchair.  Loli Huang RN    "

## 2017-09-02 LAB
AMPHETAMINES UR QL SCN>1000 NG/ML: NEGATIVE
BACTERIA SPEC CULT: NORMAL
BACTERIA SPEC CULT: NORMAL
BARBITURATES UR QL SCN: NEGATIVE
BENZODIAZ UR QL SCN: NEGATIVE
BZE UR QL SCN>300 NG/ML: NEGATIVE
CARBOXYTHC UR QL SCN: NEGATIVE
CREAT UR-MCNC: 46 MG/DL
ETHANOL UR QL: NEGATIVE
GP B STREP DNA SPEC QL NAA+PROBE: POSITIVE
METHADONE UR QL SCN: NEGATIVE
OPIATES UR QL SCN: NEGATIVE
OXYCODONE UR QL SCN: NEGATIVE
PCP CTO UR SCN-MCNC: NEGATIVE NG/ML
SPECIMEN SOURCE: ABNORMAL
SPECIMEN SOURCE: NORMAL
T PALLIDUM IGG+IGM SER QL: NEGATIVE

## 2017-09-02 PROCEDURE — 96366 THER/PROPH/DIAG IV INF ADDON: CPT

## 2017-09-02 PROCEDURE — 25000128 H RX IP 250 OP 636: Performed by: OBSTETRICS & GYNECOLOGY

## 2017-09-02 PROCEDURE — G0378 HOSPITAL OBSERVATION PER HR: HCPCS

## 2017-09-02 PROCEDURE — 25000132 ZZH RX MED GY IP 250 OP 250 PS 637: Performed by: OBSTETRICS & GYNECOLOGY

## 2017-09-02 PROCEDURE — 96372 THER/PROPH/DIAG INJ SC/IM: CPT

## 2017-09-02 PROCEDURE — 99224 ZZC SUBSEQUENT OBSERVATION CARE,LEVEL I: CPT | Performed by: OBSTETRICS & GYNECOLOGY

## 2017-09-02 PROCEDURE — 96361 HYDRATE IV INFUSION ADD-ON: CPT

## 2017-09-02 RX ADMIN — METRONIDAZOLE 500 MG: 500 TABLET ORAL at 08:32

## 2017-09-02 RX ADMIN — BETAMETHASONE SODIUM PHOSPHATE AND BETAMETHASONE ACETATE 12 MG: 3; 3 INJECTION, SUSPENSION INTRA-ARTICULAR; INTRALESIONAL; INTRAMUSCULAR at 17:50

## 2017-09-02 RX ADMIN — METRONIDAZOLE 500 MG: 500 TABLET ORAL at 20:28

## 2017-09-02 RX ADMIN — SODIUM CHLORIDE, POTASSIUM CHLORIDE, SODIUM LACTATE AND CALCIUM CHLORIDE 1000 ML: 600; 310; 30; 20 INJECTION, SOLUTION INTRAVENOUS at 05:30

## 2017-09-02 RX ADMIN — MAGNESIUM SULFATE IN WATER 2 G/HR: 40 INJECTION, SOLUTION INTRAVENOUS at 03:33

## 2017-09-02 NOTE — PLAN OF CARE
Problem: Goal Outcome Summary  Goal: Goal Outcome Summary  Outcome: No Change   Labor Shift Note  Data: Contraction pattern stable and within parameters. Fetal assessment Appropriate for Gestational Age x2.  Tocolysis: none at this time.  Betamethasone- 1st dose given on  at 1722, second dose scheduled today  at 1722.  Magnesium Sulfate for neuroprotection: currently running and per MD, will run until second dose of beta given. Patient denies any s/s of magnesium toxicity overnight.  Interventions: Continue uterine assessment continuously and fetal assessment 3 times daily. Activity level:Bed rest, and preventive measures including Positioning and Frequent voiding. Encourage active range of motion and frequent position changes.  Plan: Continue expectant management. Observe for and notify care provider of indications of progressing labor or signs of fetal/maternal compromise.

## 2017-09-02 NOTE — DISCHARGE SUMMARY
[facility] Discharge Summary    Syed Orantes MRN# [id number]   Age: 27 year old YOB: 1990     Date of Admission:  2017  Date of Discharge::  9/3/2017   Admitting Physician:  Alecia Lazo MD  Discharge Physician:  Alecia Lazo MD          Admission Diagnoses:   -  at 25w1d  - Ovulation induction pregnancy with Clomid  - Dichorionic diamniotic twin gestation  - History of prior  section  - Cervical shortening, funneling          Discharge Diagnosis:   -  at 25w3d   - Otherwise same as above          Procedures:   Procedure(s): IV magnesium  Betamethasone x2 (-)            Medications Prior to Admission:     Prescriptions Prior to Admission   Medication Sig Dispense Refill Last Dose     Ferrous Sulfate (IRON SUPPLEMENT PO) Take by mouth daily   2017 at 2100     Prenatal Vit-Fe Fumarate-FA (PRENATAL VITAMIN) 27-0.8 MG TABS Take 1 tablet by mouth daily 90 tablet 3 2017 at 2100     order for DME One pregnancy belt. 1 Device 0              Discharge Medications:        Review of your medicines      START taking       Dose / Directions    metroNIDAZOLE 500 MG tablet   Commonly known as:  FLAGYL   Indication:  bacterial vaginosis   Used for:  BV (bacterial vaginosis)        Dose:  500 mg   Take 1 tablet (500 mg) by mouth every 12 hours for 5 days   Quantity:  10 tablet   Refills:  0         CONTINUE these medicines which have NOT CHANGED       Dose / Directions    IRON SUPPLEMENT PO        Take by mouth daily   Refills:  0       order for DME   Used for:  Dichorionic diamniotic twin pregnancy in second trimester, Back pain affecting pregnancy, second trimester        One pregnancy belt.   Quantity:  1 Device   Refills:  0       Prenatal Vitamin 27-0.8 MG Tabs   Used for:  Pregnancy test positive        Dose:  1 tablet   Take 1 tablet by mouth daily   Quantity:  90 tablet   Refills:  3            Where to get your medicines      These  medications were sent to [id number] - [place], [place] - [address], [facility] [address]     Phone:  [phone]      metroNIDAZOLE 500 MG tablet                  Consultations:   NICU  MFM          Brief Admission History   Ms. Syed Orantes is a 27 year old  at 25w1d by LMP c/w 9w1d US, who is admitted after incidental finding of shortened cervix and membrane funneling on today's ultrasound.  This is a dichorionic diamniotic twin gestation.     She states that she has been feeling well.  She does report back pain (R>L). She denies any contractions/cramping, leakage of fluid, or vaginal bleeding. She confirms active fetal movement. She denies fevers/chills, headache, changes in vision, nausea/vomiting, chest pain, shortness of breath, RUQ pain, or other systemic symptoms. She has been hydrating well. Last ate this morning at 800 am.           Hospital Course:   Her cervix was examined and noted to be /-3 digitally, previously appeared closed on SSE in clinic. She was admitted and there was some concern for  labor given contractions noted on tocometry. She received betamethasone on  - . She was started on IV magnesium for neuroprotection and this was continued through her betamethasone window. She was started on a 7 day course of flagyl given clue cells on wet prep. Otherwise infectious work up performed was negative. On HD#3 after her betamethasone window she was noted to be clinically stable without contractions. Repeat cervical exam was unchanged from prior. She was discharged to home in stable condition at that time.           Discharge Instructions and Follow-Up:   Discharge diet: Regular   Discharge activity: As tolerated   Discharge follow-up: Follow up with your primary OB in less than 1 week           Discharge Disposition:   Discharged to home      Mable Bassett MD  OBGYN PGY-3  [phone]  11:21 AM 9/3/2017

## 2017-09-02 NOTE — PLAN OF CARE
Problem:  Labor (Adult,Obstetrics,Pediatric)  Goal: Signs and Symptoms of Listed Potential Problems Will be Absent or Manageable ( Labor)  Signs and symptoms of listed potential problems will be absent or manageable by discharge/transition of care (reference  Labor (Adult,Obstetrics,Pediatric) CPG).   Outcome: Therapy, progress toward functional goals as expected  D: Patient states she has no contractions or back ache. Magnesium was discontinued at 1315 per Dr. Head and Dr. Alves at the bedside. Monitoring changed to TID and continuous toco discontinued. Will administer Betamethasone this evening. Patient hoping to go home tomorrow. P: Continue to monitor.

## 2017-09-02 NOTE — PLAN OF CARE
Problem:  Labor (Adult,Obstetrics,Pediatric)  Goal: Signs and Symptoms of Listed Potential Problems Will be Absent or Manageable ( Labor)  Signs and symptoms of listed potential problems will be absent or manageable by discharge/transition of care (reference  Labor (Adult,Obstetrics,Pediatric) CPG).  Outcome: Improving  Patient denies feeling cramping/contractions, bleeding, LOF. Initially cx q2-3min, spaced out this evening and stopped. 1st dose of beta given at 1722. Magnesium infusing. VSS. Adequate I&O. Baby A: FHR baseline 125, moderate variability, accels present, no decels. Baby B: FHR baseline 140, moderate variability, accels present, no decels. Will continue to monitor.

## 2017-09-03 VITALS
RESPIRATION RATE: 20 BRPM | HEART RATE: 88 BPM | TEMPERATURE: 98 F | DIASTOLIC BLOOD PRESSURE: 57 MMHG | SYSTOLIC BLOOD PRESSURE: 127 MMHG

## 2017-09-03 LAB
C TRACH DNA SPEC QL NAA+PROBE: NEGATIVE
N GONORRHOEA DNA SPEC QL NAA+PROBE: NEGATIVE
SPECIMEN SOURCE: NORMAL
SPECIMEN SOURCE: NORMAL

## 2017-09-03 PROCEDURE — 59025 FETAL NON-STRESS TEST: CPT | Mod: 26 | Performed by: OBSTETRICS & GYNECOLOGY

## 2017-09-03 PROCEDURE — 25000132 ZZH RX MED GY IP 250 OP 250 PS 637: Performed by: OBSTETRICS & GYNECOLOGY

## 2017-09-03 PROCEDURE — G0378 HOSPITAL OBSERVATION PER HR: HCPCS

## 2017-09-03 PROCEDURE — 99217 ZZC OBSERVATION CARE DISCHARGE: CPT | Mod: 25 | Performed by: OBSTETRICS & GYNECOLOGY

## 2017-09-03 RX ORDER — METRONIDAZOLE 500 MG/1
500 TABLET ORAL EVERY 12 HOURS
Qty: 10 TABLET | Refills: 0 | Status: SHIPPED | OUTPATIENT
Start: 2017-09-03 | End: 2017-09-08

## 2017-09-03 RX ADMIN — METRONIDAZOLE 500 MG: 500 TABLET ORAL at 08:42

## 2017-09-03 NOTE — PROGRESS NOTES
Antepartum Daily Progress Note:      Subjective:  Contractions:  none  Leakage of fluid:  no  Vaginal bleeding:  no  Fetal movement:  yes    Feeling well and no contractions. Ready to go home if possible.  Feels bad that she was not feeling the contractions on Friday either though.     Objective:  Vitals:    17 1550 17 2030 17 2357 17 0345   BP: 125/62 139/69 131/75 120/59   Pulse:       Resp: 18 16 16 16   Temp: 97.7  F (36.5  C) 98.5  F (36.9  C) 97.7  F (36.5  C)    TempSrc: Oral Oral Oral        FETAL HEART TONES: baseline 135 x2 .  moderate variablility, no decels, Category I     NST not done--too early gestation  [place]:  none     Abdomen:  Gravid, NT  Ext:  NT, no edema  Cx: ?FT (internal os barely felt)/ 50/-4 and posterior    Betamethasone given  and   BV on day 2 of flagyl      ASSESSMENT:  1. 27 year old  at 25w3d with di/di twins  2. No measurable cervix on ultrasound , s/p course of beta  3. Cervix either better or unchanged after nearly 48 hours       PLAN:  Discharge to home.  Will plan RTC with primary later this week. Continue flagyl for BV.  Discussed if having contractions worth knowing about she will be aware, so not to worry right now.  Answered questions.    <30 min spent on discharge activities      JUSTICE CAT

## 2017-09-03 NOTE — PLAN OF CARE
Problem: Goal Outcome Summary  Goal: Goal Outcome Summary  Outcome: No Change   Labor Shift Note  Data: Contraction pattern stable and within parameters. Fetal assessment Appropriate for Gestational Age x2.  Tocolysis: none at this time.  Betamethasone Completed.  Magnesium Sulfate for neuroprotection Completed.  Interventions: Continue uterine/fetal assessment 3 times daily. Activity level:Regular activity, and preventive measures including Positioning and Frequent voiding. Encourage active range of motion and frequent position changes.  Plan: Continue expectant management. Observe for and notify care provider of indications of progressing labor or signs of fetal/maternal compromise. Potential DC today or tomorrow after rounds.

## 2017-09-03 NOTE — DISCHARGE INSTRUCTIONS
Discharge Instruction for Undelivered Patients      You were seen for: Labor Assessment  We Consulted: Dr. Syed and Dr. Lazo  You had (Test or Medicine):monitoring, betamethasone, and cervical check     Diet:   Drink 8 to 12 glasses of liquids (milk, juice, water) every day.  You may eat meals and snacks.     Activity:  Call your doctor or nurse midwife if your baby is moving less than usual.     Call your provider if you notice:  Swelling in your face or increased swelling in your hands or legs.  Headaches that are not relieved by Tylenol (acetaminophen).  Changes in your vision (blurring: seeing spots or stars.)  Nausea (sick to your stomach) and vomiting (throwing up).   Weight gain of 5 pounds or more per week.  Heartburn that doesn't go away.  Signs of bladder infection: pain when you urinate (use the toilet), need to go more often and more urgently.  The bag of warner (rupture of membranes) breaks, or you notice leaking in your underwear.  Bright red blood in your underwear.  Abdominal (lower belly) or stomach pain.  For first baby: Contractions (tightening) less than 5 minutes apart for one hour or more.  Second (plus) baby: Contractions (tightening) less than 10 minutes apart and getting stronger.  *If less than 34 weeks: Contractions (tightenings) more than 6 times in one hour.  Increase or change in vaginal discharge (note the color and amount)      Follow-up:  As scheduled in the clinic      Need to be seen in clinic later this week.  Growth ultrasound in 3-4 weeks at [facility]

## 2017-09-03 NOTE — PLAN OF CARE
Problem: Goal Outcome Summary  Goal: Goal Outcome Summary  Outcome: No Change  VSS. Denies vaginal bleeding, LOF, and ctx. Admits active fetal movement x2. Difficult to monitor babies due to continuous movement, plan to retry monitoring overnight given inability to monitor on evening shift. 2nd dose of beta given this shift. Pt anticipating discharge on 9/3 if she remains stable.

## 2017-09-03 NOTE — PLAN OF CARE
Problem:  Labor (Adult,Obstetrics,Pediatric)  Goal: Signs and Symptoms of Listed Potential Problems Will be Absent or Manageable ( Labor)  Signs and symptoms of listed potential problems will be absent or manageable by discharge/transition of care (reference  Labor (Adult,Obstetrics,Pediatric) CPG).   D: Cervix was rechecked by Dr. Lazo and was noted to be a fingertip. Discharge instructions reviewed with patient and no questions at this time. Patient to follow up at primary clinic this week and [facility] for growth ultrasound in 3-4 wks. Patient discharged to home.

## 2017-09-03 NOTE — PROGRESS NOTES
SANDRA MD ANTEPARTUM PROGRESS NOTE:   2017 9:33 PM   DELAYED ENTRY, patient seen this afternoon at 1315         SUBJECTIVE:   Patient comfortable. Denies any contractions or pain. No leaking or bleeding. Active fetal movement x2. No concerns. Some back pain, is positional, feels muscular.         OBJECTIVE:     Vitals:    17 0330 17 0834 17 1400 17 1550   BP: 137/63 126/68 123/68 125/62   Pulse:       Resp:    Temp:  97.8  F (36.6  C) 97.6  F (36.4  C) 97.7  F (36.5  C)   TempSrc:  Oral Oral Oral       Fetal Heart Rate Tracing:   A: 130bpm baseline, mod moses, no accels, no decels.  Category 1  B: 140bpm baseline, mod moses, no accels, no decels.  Category 1    Tocometer: No contractions    Gen: alert, oriented, no distress  Resp: normal respiratory effort, no increased work of breathing  Abdomen: soft, gravid, nontender  Extr: warm, well perfused, no edema, nontender    SCE: not repeated    No results found for this or any previous visit (from the past 24 hour(s)).             ASSESSMENT/PLAN:    27 year old  with di/di twins at 25w2d LMP c/w 9w1d US, who is admitted after incidental finding of shortened cervix and membrane funneling on ultrasound. Currently hospital day 2. Her pregnancy is otherwise complicated by: dichorionic diamniotic twin gestation and history of prior  section.     - Cervical shortening/Membrane funneling/Concern for  labor                         - Ultrasound findings notable for cervical length of 0.0 mm and funneling.                         - SVE: /70/-3 on admission. No contractions currently.                         - Wet prep with clue cells, asymptomatic. GC/CT and GBS pending.                         - Management: BMZ for fetal lung maturity, due for second dose today. Stop magnesium sulfate for now. NICU consult. Delivery plan would be  section if labors pending fetal position and status.     -Fetal Well  Being                         - Category I FHT x 2, appropriate for gestational age                         - EFW by US : 1Ib 11oz (45%ile) and 1Ib 13 oz (55%ile)                         - Continue EFM TID    Plan to recheck cervix tomorrow, sooner PRN. If stable and no signs of  labor, consider discharge to home tomorrow.      Tessa Head MD

## 2017-09-05 LAB
BACTERIA SPEC CULT: ABNORMAL
SPECIMEN SOURCE: ABNORMAL

## 2017-09-05 NOTE — TELEPHONE ENCOUNTER
Reason for Call:  Same Day Appointment, Requested Provider:  DR. FREDDIE ZAMORA    PCP: Gianna Price    Reason for visit: follow up  labor    Duration of symptoms: NA    Have you been treated for this in the past? Yes    Additional comments: Patient scheduled a follow up appointment tomorrow at 11am , wondering if she can come if earlier around 9 am . Please call patient.     Can we leave a detailed message on this number? YES    Phone number patient can be reached at: Home number on file [phone] (home)    Best Time: any    Call taken on 2017 at 9:47 AM by Claudia Fabian

## 2017-09-05 NOTE — TELEPHONE ENCOUNTER
I called patient.  There are no appointments available earlier in the day tomorrow.  Patient stated she has to work at 10am so asked that I cancel her appointment.  She has the next appointment on 9/13/2017.  Roxanne Barajas RN

## 2017-09-06 ENCOUNTER — TELEPHONE (OUTPATIENT)
Dept: OBGYN | Facility: CLINIC | Age: 27
End: 2017-09-06

## 2017-09-06 NOTE — TELEPHONE ENCOUNTER
This writer attempted to contact Ariellehijuliette on 09/06/17      Reason for call returning her call and left message to return call.      If patient calls back:   Patient contacted by clinic RN team. Inform patient that someone from the team will contact them, document that pt called and route to care team. .      Chana Dewitt RN

## 2017-09-06 NOTE — TELEPHONE ENCOUNTER
Reason for call:  Patient reporting a symptom    Symptom or request: sharp stomach and back pain.    Duration (how long have symptoms been present): late last night.    Have you been treated for this before? No    Additional comments: Message sent per [facility] - triage line.    Phone Number patient can be reached at:  Home number on file [phone] (home)    Best Time:  any    Can we leave a detailed message on this number:  NO    Call taken on 9/6/2017 at 8:03 AM by Claudia Fabian

## 2017-09-06 NOTE — TELEPHONE ENCOUNTER
Phone call to patient. Patient reported since last noc she has a sharp pain on the right side of pelvis and radiates into her lower back pain. She also noted decreased fetal movement in twin that is on the right side. Patient stated she does not know if pain is a contraction or not as last noc she was at the hospital being monitored and was told she was contraction every 1-2 mins but she was not feeling ctrnxs. Patient agreed to an office visit with Dr. Arthur at 1100 today. Chana Dewitt RN, BAN

## 2017-09-06 NOTE — TELEPHONE ENCOUNTER
Received a [employer] message from TAHMINA Evans stating based on patient's hx Dr. Stevenss is advising patient be evaluated at [facility].   Phone call to patient and gave Dr. Arthur's orders. Patient agreed to follow plan. Patient stated she is about 30-40 mins away from hospital. Explained will call Dr. Villegas and update her that patient is being sent to hospital.  Paged and spoke to Dr. Villegas. Chana Dewitt RN, BAN

## 2017-09-13 ENCOUNTER — PRENATAL OFFICE VISIT (OUTPATIENT)
Dept: OBGYN | Facility: CLINIC | Age: 27
End: 2017-09-13
Payer: COMMERCIAL

## 2017-09-13 VITALS
SYSTOLIC BLOOD PRESSURE: 142 MMHG | OXYGEN SATURATION: 98 % | HEART RATE: 103 BPM | DIASTOLIC BLOOD PRESSURE: 85 MMHG | WEIGHT: 269.6 LBS | BODY MASS INDEX: 40.4 KG/M2

## 2017-09-13 DIAGNOSIS — O30.042 DICHORIONIC DIAMNIOTIC TWIN PREGNANCY IN SECOND TRIMESTER: ICD-10-CM

## 2017-09-13 DIAGNOSIS — O16.9 ELEVATED BLOOD PRESSURE AFFECTING PREGNANCY, ANTEPARTUM: Primary | ICD-10-CM

## 2017-09-13 LAB
ALT SERPL W P-5'-P-CCNC: 32 U/L (ref 0–50)
AST SERPL W P-5'-P-CCNC: 13 U/L (ref 0–45)
CREAT UR-MCNC: 168 MG/DL
DIFFERENTIAL METHOD BLD: ABNORMAL
EOSINOPHIL # BLD AUTO: 0.1 10E9/L (ref 0–0.7)
EOSINOPHIL NFR BLD AUTO: 1 %
ERYTHROCYTE [DISTWIDTH] IN BLOOD BY AUTOMATED COUNT: 17 % (ref 10–15)
HCT VFR BLD AUTO: 31.3 % (ref 35–47)
HGB BLD-MCNC: 10.2 G/DL (ref 11.7–15.7)
LYMPHOCYTES # BLD AUTO: 1.4 10E9/L (ref 0.8–5.3)
LYMPHOCYTES NFR BLD AUTO: 12 %
MCH RBC QN AUTO: 29.2 PG (ref 26.5–33)
MCHC RBC AUTO-ENTMCNC: 32.6 G/DL (ref 31.5–36.5)
MCV RBC AUTO: 90 FL (ref 78–100)
MONOCYTES # BLD AUTO: 1.1 10E9/L (ref 0–1.3)
MONOCYTES NFR BLD AUTO: 10 %
NEUTROPHILS # BLD AUTO: 8.8 10E9/L (ref 1.6–8.3)
NEUTROPHILS NFR BLD AUTO: 77 %
PLATELET # BLD AUTO: 270 10E9/L (ref 150–450)
PLATELET # BLD EST: NORMAL 10*3/UL
PROT UR-MCNC: 0.29 G/L
PROT/CREAT 24H UR: 0.17 G/G CR (ref 0–0.2)
RBC # BLD AUTO: 3.49 10E12/L (ref 3.8–5.2)
URATE SERPL-MCNC: 4.6 MG/DL (ref 2.6–6)
WBC # BLD AUTO: 11.4 10E9/L (ref 4–11)

## 2017-09-13 PROCEDURE — 84460 ALANINE AMINO (ALT) (SGPT): CPT | Performed by: OBSTETRICS & GYNECOLOGY

## 2017-09-13 PROCEDURE — 84450 TRANSFERASE (AST) (SGOT): CPT | Performed by: OBSTETRICS & GYNECOLOGY

## 2017-09-13 PROCEDURE — 99207 ZZC PRENATAL VISIT: CPT | Performed by: OBSTETRICS & GYNECOLOGY

## 2017-09-13 PROCEDURE — 84550 ASSAY OF BLOOD/URIC ACID: CPT | Performed by: OBSTETRICS & GYNECOLOGY

## 2017-09-13 PROCEDURE — 85025 COMPLETE CBC W/AUTO DIFF WBC: CPT | Performed by: OBSTETRICS & GYNECOLOGY

## 2017-09-13 PROCEDURE — 36415 COLL VENOUS BLD VENIPUNCTURE: CPT | Performed by: OBSTETRICS & GYNECOLOGY

## 2017-09-13 PROCEDURE — 84156 ASSAY OF PROTEIN URINE: CPT | Performed by: OBSTETRICS & GYNECOLOGY

## 2017-09-13 NOTE — NURSING NOTE
"Chief Complaint   Patient presents with     Prenatal Care     26.6 weeks       Initial /85 (BP Location: Right arm, Cuff Size: Adult Regular)  Pulse 103  Wt 269 lb 9.6 oz (122.3 kg)  LMP 03/09/2017 (Exact Date)  SpO2 98%  BMI 40.4 kg/m2 Estimated body mass index is 40.4 kg/(m^2) as calculated from the following:    Height as of 4/18/17: 5' 8.5\" (1.74 m).    Weight as of this encounter: 269 lb 9.6 oz (122.3 kg).  Medication Reconciliation: complete   JORDAN Gilliland 9/13/2017         "

## 2017-09-13 NOTE — MR AVS SNAPSHOT
After Visit Summary   9/13/2017    Syed Orantes    MRN: [id number]           Patient Information     Date Of Birth          1990        Visit Information        Provider Department      9/13/2017 9:00 AM Eduardo Arthur MD [facility]        Today's Diagnoses     Elevated blood pressure affecting pregnancy, antepartum    -  1    Dichorionic diamniotic twin pregnancy in second trimester           Follow-ups after your visit        Follow-up notes from your care team     Return in about 2 weeks (around 9/27/2017) for prenatal visit.      Your next 10 appointments already scheduled     Sep 27, 2017  8:00 AM CDT   MFM US COMPRE SINGLE F/U with [id number]   [facility])    [address]  [facility] [id number] [phone]           Wear comfortable clothes and leave your valuables at home.            Sep 27, 2017  8:30 AM CDT   Radiology MD with UR BUBBA ARCINIEGA   [facility])    [address]  [facility] [address]   [phone]           Please arrive at the time given for your first appointment. This visit is used internally to schedule the physician's time during your ultrasound.              Future tests that were ordered for you today     Open Future Orders        Priority Expected Expires Ordered    Creatinine clearance AMB Routine  11/13/2017 9/13/2017    Protein timed urine with Creat Ratio Routine  9/13/2018 9/13/2017            Who to contact     If you have questions or need follow up information about today's clinic visit or your schedule please contact [facility] directly at [phone].  Normal or non-critical lab and imaging results will be communicated to you by MyChart, letter or phone within 4 business days after the clinic has received the results. If you do  not hear from us within 7 days, please contact the clinic through [employer] or phone. If you have a critical or abnormal lab result, we will notify you by phone as soon as possible.  Submit refill requests through [employer] or call your pharmacy and they will forward the refill request to us. Please allow 3 business days for your refill to be completed.          Additional Information About Your Visit        [employer] Information     [employer] gives you secure access to your electronic health record. If you see a primary care provider, you can also send messages to your care team and make appointments. If you have questions, please call your primary care clinic.  If you do not have a primary care provider, please call [phone] and they will assist you.        Care EveryWhere ID     This is your Care EveryWhere ID. This could be used by other organizations to access your [facility] medical records  [id number]        Your Vitals Were     Pulse Last Period Pulse Oximetry BMI (Body Mass Index)          103 03/09/2017 (Exact Date) 98% 40.4 kg/m2         Blood Pressure from Last 3 Encounters:   09/13/17 142/85   09/03/17 127/57   08/23/17 118/73    Weight from Last 3 Encounters:   09/13/17 269 lb 9.6 oz (122.3 kg)   08/23/17 265 lb (120.2 kg)   07/26/17 261 lb (118.4 kg)              We Performed the Following     ALT     AST     CBC with platelets differential     Protein  random urine with Creat Ratio     Uric acid        Primary Care Provider Office Phone # Fax #    Gianna Price [phone]       [address] [facility] [address]        Equal Access to Services     [facility]: Hadii aad ku hadasho Soomaali, waaxda luqadaha, qaybta kaalmada boyd woods . So [facility] [phone].    ATENCIÓN: Si habla español, tiene a wise disposición servicios gratuitos de asistencia lingüística. Llame al [phone].    We comply with applicable federal civil rights laws and  [place] laws. We do not discriminate on the basis of race, color, national origin, age, disability sex, sexual orientation or gender identity.            Thank you!     Thank you for choosing [facility]  for your care. Our goal is always to provide you with excellent care. Hearing back from our patients is one way we can continue to improve our services. Please take a few minutes to complete the written survey that you may receive in the mail after your visit with us. Thank you!             Your Updated Medication List - Protect others around you: Learn how to safely use, store and throw away your medicines at www.disposemymeds.org.          This list is accurate as of: 9/13/17  9:27 AM.  Always use your most recent med list.                   Brand Name Dispense Instructions for use Diagnosis    IRON SUPPLEMENT PO      Take by mouth daily        order for DME     1 Device    One pregnancy belt.    Dichorionic diamniotic twin pregnancy in second trimester, Back pain affecting pregnancy, second trimester       Prenatal Vitamin 27-0.8 MG Tabs     90 tablet    Take 1 tablet by mouth daily    Pregnancy test positive

## 2017-09-13 NOTE — LETTER
September 13, 2017      Syed Orantes  [address] [facility] [address]          To Whom it may concern.    Syed has been following with me in the clinic for her prenatal care.  At this time I suggest to continue with the current work hours.  This might be amended in the future, depending upon circumstances.        Sincerely,              Eduadro Arthur MD

## 2017-09-13 NOTE — PROGRESS NOTES
26w6d   Doing well.  No problems.    She has not had visual changes.  She has had headaches with the pregnancy.  The headaches resolve with sleep.  No contractions.   Baseline labs for the blood pressure elevation.   Check blood pressures at work.  RTC 2wk.  Eduardo Arthur MD

## 2017-09-27 ENCOUNTER — HOSPITAL ENCOUNTER (OUTPATIENT)
Dept: ULTRASOUND IMAGING | Facility: CLINIC | Age: 27
Discharge: HOME | End: 2017-09-27
Attending: OBSTETRICS & GYNECOLOGY | Admitting: SOCIAL WORKER
Payer: COMMERCIAL

## 2017-09-27 ENCOUNTER — OFFICE VISIT (OUTPATIENT)
Dept: MATERNAL FETAL MEDICINE | Facility: CLINIC | Age: 27
End: 2017-09-27
Attending: OBSTETRICS & GYNECOLOGY
Payer: COMMERCIAL

## 2017-09-27 DIAGNOSIS — O30.049 DICHORIONIC DIAMNIOTIC TWIN PREGNANCY, ANTEPARTUM: ICD-10-CM

## 2017-09-27 PROCEDURE — 76816 OB US FOLLOW-UP PER FETUS: CPT | Mod: 59

## 2017-09-27 NOTE — PROGRESS NOTES
Please see ultrasound report under imaging tab for details on ultrasound performed today.    Tessa Syed MD  , OB/GYN  Maternal-Fetal Medicine  [email or web address]  [phone] (Academic office)  [phone] (Pager)

## 2017-09-27 NOTE — MR AVS SNAPSHOT
After Visit Summary   9/27/2017    Syed Orantes    MRN: [id number]           Patient Information     Date Of Birth          1990        Visit Information        Provider Department      9/27/2017 8:30 AM Tessa Syed MD [facility] Maternal Fetal Medicine [facility]        Today's Diagnoses     Dizygotic twins    -  1       Follow-ups after your visit        Your next 10 appointments already scheduled     Oct 04, 2017  2:45 PM CDT   ESTABLISHED PRENATAL with Eduardo Arthur MD   [facility] ([facility])    [address]  [place] [address]   [phone]            Oct 25, 2017  8:00 AM CDT   MFM US COMPRE SINGLE F/U with [id number]   [facility] Maternal Fetal Medicine Ultrasound - [facility])    [address]  [facility] [id number] [phone]           Wear comfortable clothes and leave your valuables at home.            Oct 25, 2017  8:30 AM CDT   Radiology MD with UR BUBBA ARCINIEGA   [facility] Maternal Fetal Medicine [facility])    [address]  [facility] [address]   [phone]           Please arrive at the time given for your first appointment. This visit is used internally to schedule the physician's time during your ultrasound.              Future tests that were ordered for you today     Open Future Orders        Priority Expected Expires Ordered    MFM US Comprehensive Single F/U Routine  9/27/2018 9/27/2017    MFM US Comprehensive Single F/U Routine  9/27/2018 9/27/2017    Maternal Fetal US Comprehensive Twins FU Routine 9/29/2017 9/1/2018 9/1/2017            Who to contact     If you have questions or need follow up information about today's clinic visit or your schedule please contact [facility] MATERNAL FETAL MEDICINE [facility] directly at [phone].  Normal or non-critical lab and imaging results will be communicated  to you by eduClipperhart, letter or phone within 4 business days after the clinic has received the results. If you do not hear from us within 7 days, please contact the clinic through SafetyTat or phone. If you have a critical or abnormal lab result, we will notify you by phone as soon as possible.  Submit refill requests through SafetyTat or call your pharmacy and they will forward the refill request to us. Please allow 3 business days for your refill to be completed.          Additional Information About Your Visit        eduClipperharDanlan Information     SafetyTat gives you secure access to your electronic health record. If you see a primary care provider, you can also send messages to your care team and make appointments. If you have questions, please call your primary care clinic.  If you do not have a primary care provider, please call [phone] and they will assist you.        Care EveryWhere ID     This is your Care EveryWhere ID. This could be used by other organizations to access your [place] medical records  [id number]        Your Vitals Were     Last Period                   03/09/2017 (Exact Date)            Blood Pressure from Last 3 Encounters:   09/13/17 142/85   09/03/17 127/57   08/23/17 118/73    Weight from Last 3 Encounters:   09/13/17 122.3 kg (269 lb 9.6 oz)   08/23/17 120.2 kg (265 lb)   07/26/17 118.4 kg (261 lb)               Primary Care Provider Office Phone # Fax #    Gianna Price [phone] [address] [facility] [address]        Equal Access to Services     [facility]: Hadii aad ku hadasho Soomaali, waaxda luqadaha, qaybta kaalmada adeegyada, boyd hernandez. So [facility] [phone].    ATENCIÓN: Si habla español, tiene a wise disposición servicios gratuitos de asistencia lingüística. Llame al [phone].    We comply with applicable federal civil rights laws and [place] laws. We do not discriminate on the basis of race, color, national  origin, age, disability sex, sexual orientation or gender identity.            Thank you!     Thank you for choosing [facility]  for your care. Our goal is always to provide you with excellent care. Hearing back from our patients is one way we can continue to improve our services. Please take a few minutes to complete the written survey that you may receive in the mail after your visit with us. Thank you!             Your Updated Medication List - Protect others around you: Learn how to safely use, store and throw away your medicines at www.disposemymeds.org.          This list is accurate as of: 9/27/17  9:01 AM.  Always use your most recent med list.                   Brand Name Dispense Instructions for use Diagnosis    IRON SUPPLEMENT PO      Take by mouth daily        order for DME     1 Device    One pregnancy belt.    Dichorionic diamniotic twin pregnancy in second trimester, Back pain affecting pregnancy, second trimester       Prenatal Vitamin 27-0.8 MG Tabs     90 tablet    Take 1 tablet by mouth daily    Pregnancy test positive

## 2017-10-04 ENCOUNTER — PRENATAL OFFICE VISIT (OUTPATIENT)
Dept: OBGYN | Facility: CLINIC | Age: 27
End: 2017-10-04
Payer: COMMERCIAL

## 2017-10-04 VITALS
SYSTOLIC BLOOD PRESSURE: 152 MMHG | OXYGEN SATURATION: 99 % | WEIGHT: 275 LBS | HEART RATE: 99 BPM | DIASTOLIC BLOOD PRESSURE: 80 MMHG | BODY MASS INDEX: 41.21 KG/M2

## 2017-10-04 DIAGNOSIS — O30.042 DICHORIONIC DIAMNIOTIC TWIN PREGNANCY IN SECOND TRIMESTER: ICD-10-CM

## 2017-10-04 PROCEDURE — 99207 ZZC PRENATAL VISIT: CPT | Performed by: OBSTETRICS & GYNECOLOGY

## 2017-10-04 NOTE — PROGRESS NOTES
29w6d   Tired.  No HA, visual changes, N/V  She had pressure and some back pain on Monday, none since.    Past ultrasound with MFM did not show measurable cervix and funneling was seen.   Cervix:  Spec exam 2-3 cm   fFN not peformed as I manipulated the speculum some, in order to view the cervix.  Will attempt to obtain next visit.   Assessment  Recommend to have bedrest through today  Will check with MFM about cervical dilation.  If symptoms reoccur, then proceed to L&D.   Eduardo Arthur MD    10/10/2017  Addendum  I discussed with Dr. Peña.  He suggests to have the beta methasone again as she is at risk for early delivery.    Bedrest not advised, but she is able to decrease her activity.    Patient informed.  She will schedule the beta methasone injections at [facility].   Eduardo Arthur MD

## 2017-10-04 NOTE — MR AVS SNAPSHOT
After Visit Summary   10/4/2017    Syed Orantes    MRN: [id number]           Patient Information     Date Of Birth          1990        Visit Information        Provider Department      10/4/2017 2:45 PM Eduardo Arthur MD [facility]        Today's Diagnoses     Dichorionic diamniotic twin pregnancy in second trimester           Follow-ups after your visit        Follow-up notes from your care team     Return in about 1 week (around 10/11/2017) for prenatal visit.      Your next 10 appointments already scheduled     Oct 13, 2017  7:30 AM CDT   ESTABLISHED PRENATAL with Eduardo Arthur MD   [facility] ([facility])    [address]  [facility] [address]   [phone]            Oct 25, 2017  8:00 AM CDT   MFM US COMPRE SINGLE F/U with [id number]   [facility] - [facility])    [address]  [facility] [address] [phone]           Wear comfortable clothes and leave your valuables at home.            Oct 25, 2017  8:30 AM CDT   Radiology MD with UR BUBBA ARCINIEGA   [facility]etal [facility] - [facility])    [address]  [facility] [address]   [phone]           Please arrive at the time given for your first appointment. This visit is used internally to schedule the physician's time during your ultrasound.              Who to contact     If you have questions or need follow up information about today's clinic visit or your schedule please contact [facility] directly at [phone].  Normal or non-critical lab and imaging results will be communicated to you by MyChart, letter or phone within 4 business days after the clinic has received the results. If you do not hear from us within 7 days, please contact the clinic through MyChart or phone. If you have a critical  or abnormal lab result, we will notify you by phone as soon as possible.  Submit refill requests through CaseRails or call your pharmacy and they will forward the refill request to us. Please allow 3 business days for your refill to be completed.          Additional Information About Your Visit        Global Pari-Mutuel Serviceshart Information     CaseRails gives you secure access to your electronic health record. If you see a primary care provider, you can also send messages to your care team and make appointments. If you have questions, please call your primary care clinic.  If you do not have a primary care provider, please call [phone] and they will assist you.        Care EveryWhere ID     This is your Care EveryWhere ID. This could be used by other organizations to access your [place] medical records  [id number]        Your Vitals Were     Pulse Last Period Pulse Oximetry BMI (Body Mass Index)          99 03/09/2017 (Exact Date) 99% 41.21 kg/m2         Blood Pressure from Last 3 Encounters:   10/04/17 152/80   09/13/17 142/85   09/03/17 127/57    Weight from Last 3 Encounters:   10/04/17 275 lb (124.7 kg)   09/13/17 269 lb 9.6 oz (122.3 kg)   08/23/17 265 lb (120.2 kg)              Today, you had the following     No orders found for display       Primary Care Provider Office Phone # Fax #    Gianna Price [phone] [address] [facility] [address]        Equal Access to Services     [facility]: Hadii aad ku hadasho Soomaali, waaxda luqadaha, qaybta kaalmada adeqianyada, boyd hernandez. So [facility] [phone].    ATENCIÓN: Si habla español, tiene a wise disposición servicios gratuitos de asistencia lingüística. Llame al [phone].    We comply with applicable federal civil rights laws and [place] laws. We do not discriminate on the basis of race, color, national origin, age, disability, sex, sexual orientation, or gender identity.            Thank you!     Thank you  for choosing [facility]  for your care. Our goal is always to provide you with excellent care. Hearing back from our patients is one way we can continue to improve our services. Please take a few minutes to complete the written survey that you may receive in the mail after your visit with us. Thank you!             Your Updated Medication List - Protect others around you: Learn how to safely use, store and throw away your medicines at www.disposemymeds.org.          This list is accurate as of: 10/4/17  3:24 PM.  Always use your most recent med list.                   Brand Name Dispense Instructions for use Diagnosis    IRON SUPPLEMENT PO      Take by mouth daily        order for DME     1 Device    One pregnancy belt.    Dichorionic diamniotic twin pregnancy in second trimester, Back pain affecting pregnancy, second trimester       Prenatal Vitamin 27-0.8 MG Tabs     90 tablet    Take 1 tablet by mouth daily    Pregnancy test positive

## 2017-10-13 ENCOUNTER — PRENATAL OFFICE VISIT (OUTPATIENT)
Dept: OBGYN | Facility: CLINIC | Age: 27
End: 2017-10-13
Payer: COMMERCIAL

## 2017-10-13 VITALS
HEART RATE: 107 BPM | WEIGHT: 278.4 LBS | DIASTOLIC BLOOD PRESSURE: 79 MMHG | BODY MASS INDEX: 41.71 KG/M2 | OXYGEN SATURATION: 99 % | SYSTOLIC BLOOD PRESSURE: 125 MMHG

## 2017-10-13 DIAGNOSIS — O30.042 DICHORIONIC DIAMNIOTIC TWIN PREGNANCY IN SECOND TRIMESTER: ICD-10-CM

## 2017-10-13 PROCEDURE — 99207 ZZC PRENATAL VISIT: CPT | Performed by: OBSTETRICS & GYNECOLOGY

## 2017-10-13 NOTE — NURSING NOTE
"Chief Complaint   Patient presents with     Prenatal Care     31.1 weeks       Initial /79 (BP Location: Right arm, Cuff Size: Adult Large)  Pulse 107  Wt 278 lb 6.4 oz (126.3 kg)  LMP 03/09/2017 (Exact Date)  SpO2 99%  BMI 41.71 kg/m2 Estimated body mass index is 41.71 kg/(m^2) as calculated from the following:    Height as of 4/18/17: 5' 8.5\" (1.74 m).    Weight as of this encounter: 278 lb 6.4 oz (126.3 kg).  Medication Reconciliation: complete   JORDAN Gilliland 10/13/2017         "

## 2017-10-13 NOTE — PROGRESS NOTES
31w1d.    Tired.  No HA, visual changes, N/V  Cervix 4 cm on Monday at [facility] and also 2 days ago at [facility] when she received the betamethasone.  She only received one dose.  Return today for 2nd dose  OK to return to work.  RTC 2 wk  Eduardo Arthur MD

## 2017-10-13 NOTE — LETTER
October 13, 2017      RE: Syed Orantes  [address] [facility] [address]       To whom it may concern:    Syed Orantes was seen in our clinic today. She  may return to work with the following: Light duty, with weight restriction to lift no more than 10 pounds.    Sincerely,          Eduardo Arthur MD

## 2017-10-13 NOTE — MR AVS SNAPSHOT
After Visit Summary   10/13/2017    Syed Orantes    MRN: [id number]           Patient Information     Date Of Birth          1990        Visit Information        Provider Department      10/13/2017 7:30 AM [facility]        Today's Diagnoses     Dichorionic diamniotic twin pregnancy in second trimester           Follow-ups after your visit        Follow-up notes from your care team     Return in about 1 week (around 10/20/2017) for prenatal visit.      Your next 10 appointments already scheduled     Oct 25, 2017  8:00 AM CDT   MFM US COMPRE SINGLE F/U with [id number]   [facility] - [facility])    [address]  [facility] [address] [phone]           Wear comfortable clothes and leave your valuables at home.            Oct 25, 2017  8:30 AM CDT   Radiology MD with UR BUBBA ARCINIEGA   [facility] Maternal Fetal Medicine - [facility])    [address]  [facility] [address]   [phone]           Please arrive at the time given for your first appointment. This visit is used internally to schedule the physician's time during your ultrasound.              Who to contact     If you have questions or need follow up information about today's clinic visit or your schedule please contact [facility] directly at [phone].  Normal or non-critical lab and imaging results will be communicated to you by MyChart, letter or phone within 4 business days after the clinic has received the results. If you do not hear from us within 7 days, please contact the clinic through MyChart or phone. If you have a critical or abnormal lab result, we will notify you by phone as soon as possible.  Submit refill requests through Just Eat or call your pharmacy and they will forward the refill request to us. Please allow 3 business  days for your refill to be completed.          Additional Information About Your Visit        MyChart Information     Maginaticshart gives you secure access to your electronic health record. If you see a primary care provider, you can also send messages to your care team and make appointments. If you have questions, please call your primary care clinic.  If you do not have a primary care provider, please call [phone] and they will assist you.        Care EveryWhere ID     This is your Care EveryWhere ID. This could be used by other organizations to access your [place] medical records  [id number]        Your Vitals Were     Pulse Last Period Pulse Oximetry BMI (Body Mass Index)          107 03/09/2017 (Exact Date) 99% 41.71 kg/m2         Blood Pressure from Last 3 Encounters:   10/13/17 125/79   10/04/17 152/80   09/13/17 142/85    Weight from Last 3 Encounters:   10/13/17 278 lb 6.4 oz (126.3 kg)   10/04/17 275 lb (124.7 kg)   09/13/17 269 lb 9.6 oz (122.3 kg)              Today, you had the following     No orders found for display       Primary Care Provider Office Phone # Fax #    Gianna Price [phone] [address] [facility] [address]        Equal Access to Services     MORRIS KNUTSON : Hadii aad ku hadasho Soomaali, waaxda luqadaha, qaybta kaalmada adeegyada, waxcarlie idiin hayangeln elma evans laeli hernandez. So [facility] [phone].    ATENCIÓN: Si habla español, tiene a wise disposición servicios gratuitos de asistencia lingüística. Llame al [phone].    We comply with applicable federal civil rights laws and [place] laws. We do not discriminate on the basis of race, color, national origin, age, disability, sex, sexual orientation, or gender identity.            Thank you!     Thank you for choosing [facility]  for your care. Our goal is always to provide you with excellent care. Hearing back from our patients is one way we can continue to improve our services. Please  take a few minutes to complete the written survey that you may receive in the mail after your visit with us. Thank you!             Your Updated Medication List - Protect others around you: Learn how to safely use, store and throw away your medicines at www.disposemymeds.org.          This list is accurate as of: 10/13/17  8:03 AM.  Always use your most recent med list.                   Brand Name Dispense Instructions for use Diagnosis    IRON SUPPLEMENT PO      Take by mouth daily        order for DME     1 Device    One pregnancy belt.    Dichorionic diamniotic twin pregnancy in second trimester, Back pain affecting pregnancy, second trimester       Prenatal Vitamin 27-0.8 MG Tabs     90 tablet    Take 1 tablet by mouth daily    Pregnancy test positive

## 2017-10-20 ENCOUNTER — PRENATAL OFFICE VISIT (OUTPATIENT)
Dept: OBGYN | Facility: CLINIC | Age: 27
End: 2017-10-20
Payer: COMMERCIAL

## 2017-10-20 VITALS
OXYGEN SATURATION: 99 % | SYSTOLIC BLOOD PRESSURE: 120 MMHG | DIASTOLIC BLOOD PRESSURE: 78 MMHG | TEMPERATURE: 98.4 F | WEIGHT: 276.4 LBS | HEART RATE: 97 BPM | BODY MASS INDEX: 41.42 KG/M2

## 2017-10-20 DIAGNOSIS — O09.03 PREGNANCY WITH HISTORY OF INFERTILITY IN THIRD TRIMESTER: ICD-10-CM

## 2017-10-20 DIAGNOSIS — O30.043 DICHORIONIC DIAMNIOTIC TWIN PREGNANCY IN THIRD TRIMESTER: Primary | ICD-10-CM

## 2017-10-20 PROCEDURE — 99207 ZZC PRENATAL VISIT: CPT | Mod: 25 | Performed by: OBSTETRICS & GYNECOLOGY

## 2017-10-20 PROCEDURE — 59025 FETAL NON-STRESS TEST: CPT | Performed by: OBSTETRICS & GYNECOLOGY

## 2017-10-20 NOTE — MR AVS SNAPSHOT
After Visit Summary   10/20/2017    Syed Orantes    MRN: [id number]           Patient Information     Date Of Birth          1990        Visit Information        Provider Department      10/20/2017 9:30 AM Eduardo Arthur MD [facility]        Today's Diagnoses     Dichorionic diamniotic twin pregnancy in third trimester    -  1    Pregnancy with history of infertility in third trimester           Follow-ups after your visit        Follow-up notes from your care team     Return in about 1 week (around 10/27/2017).      Your next 10 appointments already scheduled     Nov 01, 2017  3:00 PM CDT   US OB BIOPHYSICAL PROFILE WITHOUT NON STRESS TEST MULTIPLE with FKUS1   [facility]    [address] [place] [address] [phone]           Please bring a list of your medicines (including vitamins, minerals and over-the-counter drugs). Also, tell your doctor about any allergies you may have. Wear comfortable clothes and leave your valuables at home.  If you re less than 20 weeks drink four 8-ounce glasses of fluid an hour before your exam. If you need to empty your bladder before your exam, try to release only a little urine. Then, drink another glass of fluid.  You may have up to two family members in the exam room. If you bring a small child, an adult must be there to care for him or her.  Please call the Imaging Department at your exam site with any questions.            Nov 01, 2017  4:15 PM CDT   ESTABLISHED PRENATAL with Eduardo [facility] MD   [facility]    [address] [place] [address] [phone]            Nov 22, 2017  9:30 AM CST   MFM US COMPRE TWINS F/U with [id number]   [facility] [place] [facility])    [address] [facility] [id number] [phone]            Wear comfortable clothes and leave your valuables at home.            Nov 22, 2017 10:00 AM CST   Radiology MD with UR BUBBA ARCINIEGA   [facility] ([facility])    [address]  [facility] [place] [address]   [phone]           Please arrive at the time given for your first appointment. This visit is used internally to schedule the physician's time during your ultrasound.              Who to contact     If you have questions or need follow up information about today's clinic visit or your schedule please contact [facility] directly at [phone].  Normal or non-critical lab and imaging results will be communicated to you by MyChart, letter or phone within 4 business days after the clinic has received the results. If you do not hear from us within 7 days, please contact the clinic through Sensipasshart or phone. If you have a critical or abnormal lab result, we will notify you by phone as soon as possible.  Submit refill requests through Oberon Space or call your pharmacy and they will forward the refill request to us. Please allow 3 business days for your refill to be completed.          Additional Information About Your Visit        MyChart Information     Oberon Space gives you secure access to your electronic health record. If you see a primary care provider, you can also send messages to your care team and make appointments. If you have questions, please call your primary care clinic.  If you do not have a primary care provider, please call [phone] and they will assist you.        Care EveryWhere ID     This is your Care EveryWhere ID. This could be used by other organizations to access your [place] medical records  [id number]        Your Vitals Were     Pulse Temperature Last Period Pulse Oximetry BMI (Body Mass Index)       97 98.4  F (36.9  C) (Oral) 03/09/2017 (Exact Date) 99% 41.42 kg/m2        Blood Pressure from Last 3 Encounters:    10/25/17 146/82   10/20/17 120/78   10/13/17 125/79    Weight from Last 3 Encounters:   10/25/17 278 lb 9.6 oz (126.4 kg)   10/20/17 276 lb 6.4 oz (125.4 kg)   10/13/17 278 lb 6.4 oz (126.3 kg)              We Performed the Following     FETAL NON-STRESS TEST        Primary Care Provider Office Phone # Fax #    Gianna Price [phone] [address] [facility] [address]        Equal Access to Services     [facility]: Hadii gela ku hadasho Soomaali, waaxda luqadaha, qaybta kaalmada peggy, boyd krishnamurthy . So [facility] [phone].    ATENCIÓN: Si habla español, tiene a wise disposición servicios gratuitos de asistencia lingüística. [facility] [phone].    We comply with applicable federal civil rights laws and [place] laws. We do not discriminate on the basis of race, color, national origin, age, disability, sex, sexual orientation, or gender identity.            Thank you!     Thank you for choosing [facility]  for your care. Our goal is always to provide you with excellent care. Hearing back from our patients is one way we can continue to improve our services. Please take a few minutes to complete the written survey that you may receive in the mail after your visit with us. Thank you!             Your Updated Medication List - Protect others around you: Learn how to safely use, store and throw away your medicines at www.disposemymeds.org.          This list is accurate as of: 10/20/17 11:59 PM.  Always use your most recent med list.                   Brand Name Dispense Instructions for use Diagnosis    IRON SUPPLEMENT PO      Take by mouth daily        order for DME     1 Device    One pregnancy belt.    Dichorionic diamniotic twin pregnancy in second trimester, Back pain affecting pregnancy, second trimester       Prenatal Vitamin 27-0.8 MG Tabs     90 tablet    Take 1 tablet by mouth daily    Pregnancy test positive

## 2017-10-20 NOTE — NURSING NOTE
"Chief Complaint   Patient presents with     Prenatal Care     32w1d       Initial /78  Pulse 97  Temp 98.4  F (36.9  C) (Oral)  Wt 276 lb 6.4 oz (125.4 kg)  LMP 03/09/2017 (Exact Date)  SpO2 99%  BMI 41.42 kg/m2 Estimated body mass index is 41.42 kg/(m^2) as calculated from the following:    Height as of 4/18/17: 5' 8.5\" (1.74 m).    Weight as of this encounter: 276 lb 6.4 oz (125.4 kg).  Medication Reconciliation: complete   Trish Roblero CMA      "

## 2017-10-25 ENCOUNTER — HOSPITAL ENCOUNTER (OUTPATIENT)
Dept: ULTRASOUND IMAGING | Facility: CLINIC | Age: 27
Discharge: HOME | End: 2017-10-25
Attending: OBSTETRICS & GYNECOLOGY | Admitting: OBSTETRICS & GYNECOLOGY
Payer: COMMERCIAL

## 2017-10-25 ENCOUNTER — PRENATAL OFFICE VISIT (OUTPATIENT)
Dept: OBGYN | Facility: CLINIC | Age: 27
End: 2017-10-25
Payer: COMMERCIAL

## 2017-10-25 ENCOUNTER — OFFICE VISIT (OUTPATIENT)
Dept: MATERNAL FETAL MEDICINE | Facility: CLINIC | Age: 27
End: 2017-10-25
Attending: OBSTETRICS & GYNECOLOGY
Payer: COMMERCIAL

## 2017-10-25 VITALS
HEART RATE: 98 BPM | SYSTOLIC BLOOD PRESSURE: 146 MMHG | DIASTOLIC BLOOD PRESSURE: 82 MMHG | OXYGEN SATURATION: 99 % | BODY MASS INDEX: 41.74 KG/M2 | WEIGHT: 278.6 LBS

## 2017-10-25 DIAGNOSIS — O30.043 DICHORIONIC DIAMNIOTIC TWIN PREGNANCY IN THIRD TRIMESTER: ICD-10-CM

## 2017-10-25 DIAGNOSIS — O16.3 ELEVATED BLOOD PRESSURE AFFECTING PREGNANCY IN THIRD TRIMESTER, ANTEPARTUM: Primary | ICD-10-CM

## 2017-10-25 DIAGNOSIS — O30.043 DICHORIONIC DIAMNIOTIC TWIN PREGNANCY IN THIRD TRIMESTER: Primary | ICD-10-CM

## 2017-10-25 PROCEDURE — 76816 OB US FOLLOW-UP PER FETUS: CPT

## 2017-10-25 PROCEDURE — 99207 ZZC PRENATAL VISIT: CPT | Performed by: OBSTETRICS & GYNECOLOGY

## 2017-10-25 PROCEDURE — 76816 OB US FOLLOW-UP PER FETUS: CPT | Mod: 59

## 2017-10-25 PROCEDURE — 76819 FETAL BIOPHYS PROFIL W/O NST: CPT

## 2017-10-25 PROCEDURE — 76819 FETAL BIOPHYS PROFIL W/O NST: CPT | Mod: 59

## 2017-10-25 NOTE — MR AVS SNAPSHOT
After Visit Summary   10/25/2017    Syed Orantes    MRN: [id number]           Patient Information     Date Of Birth          1990        Visit Information        Provider Department      10/25/2017 4:15 PM Eduardo Arthur MD [facility]        Today's Diagnoses     Elevated blood pressure affecting pregnancy in third trimester, antepartum    -  1    Dichorionic diamniotic twin pregnancy in third trimester           Follow-ups after your visit        Follow-up notes from your care team     Return in about 1 week (around 11/1/2017) for prenatal visit and BPP.      Your next 10 appointments already scheduled     Nov 01, 2017  3:00 PM CDT   US OB BIOPHYSICAL PROFILE WITHOUT NON STRESS TEST MULTIPLE with FKUS1   [facility]    [address] [place] [address]   [phone]           Please bring a list of your medicines (including vitamins, minerals and over-the-counter drugs). Also, tell your doctor about any allergies you may have. Wear comfortable clothes and leave your valuables at home.  If you re less than 20 weeks drink four 8-ounce glasses of fluid an hour before your exam. If you need to empty your bladder before your exam, try to release only a little urine. Then, drink another glass of fluid.  You may have up to two family members in the exam room. If you bring a small child, an adult must be there to care for him or her.  Please call the Imaging Department at your exam site with any questions.            Nov 01, 2017  4:15 PM CDT   ESTABLISHED PRENATAL with Eduardo Arthur MD   [facility]    [address] [place] [address]   [phone]            Nov 22, 2017  9:30 AM CST   MFM US COMPRE TWINS F/U with [id number]   [facility] [place] ([facility], [facility])    [address]  [facility] [address]   [phone]           Wear comfortable clothes and leave your valuables at home.            Nov 22, 2017 10:00 AM CST   Radiology MD with UR BUBBA ARCINIEGA   [facility] ([facility])    [address]  [facility] [address]   [phone]           Please arrive at the time given for your first appointment. This visit is used internally to schedule the physician's time during your ultrasound.              Future tests that were ordered for you today     Open Future Orders        Priority Expected Expires Ordered    US OB Fetal Biophy Prf wo NonStress Twins Twins Routine  10/26/2018 10/25/2017    MFM US Comprehensive Twins F/U Routine  8/25/2018 10/25/2017    Maternal Fetal US Comprehensive Twins FU Routine  9/27/2018 9/27/2017            Who to contact     If you have questions or need follow up information about today's clinic visit or your schedule please contact [facility] directly at [phone].  Normal or non-critical lab and imaging results will be communicated to you by Fourteen IPhart, letter or phone within 4 business days after the clinic has received the results. If you do not hear from us within 7 days, please contact the clinic through Fourteen IPhart or phone. If you have a critical or abnormal lab result, we will notify you by phone as soon as possible.  Submit refill requests through eZelleron or call your pharmacy and they will forward the refill request to us. Please allow 3 business days for your refill to be completed.          Additional Information About Your Visit        [employer] Information     eZelleron gives you secure access to your electronic health record. If you see a primary care provider, you can also send messages to your care team and make appointments. If you have questions, please call your primary care clinic.  If you do not have a primary care provider, please call [phone] and they will  assist you.        Care EveryWhere ID     This is your Care EveryWhere ID. This could be used by other organizations to access your [place] medical records  [id number]        Your Vitals Were     Pulse Last Period Pulse Oximetry BMI (Body Mass Index)          98 03/09/2017 (Exact Date) 99% 41.74 kg/m2         Blood Pressure from Last 3 Encounters:   10/25/17 146/82   10/20/17 120/78   10/13/17 125/79    Weight from Last 3 Encounters:   10/25/17 126.4 kg (278 lb 9.6 oz)   10/20/17 125.4 kg (276 lb 6.4 oz)   10/13/17 126.3 kg (278 lb 6.4 oz)               Primary Care Provider Office Phone # Fax #    Gianna Price [phone] [address] [facility] [address]        Equal Access to Services     [facility]: Hadii gela ku hadasho Soomaali, waaxda luqadaha, qaybta kaalmada adeegyada, boyd krishnamurthy . So [facility] [phone].    ATENCIÓN: Si habla español, tiene a wise disposición servicios gratuitos de asistencia lingüística. Llame al [phone].    We comply with applicable federal civil rights laws and [place] laws. We do not discriminate on the basis of race, color, national origin, age, disability, sex, sexual orientation, or gender identity.            Thank you!     Thank you for choosing [facility]  for your care. Our goal is always to provide you with excellent care. Hearing back from our patients is one way we can continue to improve our services. Please take a few minutes to complete the written survey that you may receive in the mail after your visit with us. Thank you!             Your Updated Medication List - Protect others around you: Learn how to safely use, store and throw away your medicines at www.disposemymeds.org.          This list is accurate as of: 10/25/17 11:59 PM.  Always use your most recent med list.                   Brand Name Dispense Instructions for use Diagnosis    IRON SUPPLEMENT PO      Take by mouth daily         order for DME     1 Device    One pregnancy belt.    Dichorionic diamniotic twin pregnancy in second trimester, Back pain affecting pregnancy, second trimester       Prenatal Vitamin 27-0.8 MG Tabs     90 tablet    Take 1 tablet by mouth daily    Pregnancy test positive

## 2017-10-25 NOTE — NURSING NOTE
"Chief Complaint   Patient presents with     Prenatal Care       Initial /80 (BP Location: Right arm, Cuff Size: Adult Regular)  Pulse 98  Wt 278 lb 9.6 oz (126.4 kg)  LMP 03/09/2017 (Exact Date)  SpO2 99%  BMI 41.74 kg/m2 Estimated body mass index is 41.74 kg/(m^2) as calculated from the following:    Height as of 4/18/17: 5' 8.5\" (1.74 m).    Weight as of this encounter: 278 lb 9.6 oz (126.4 kg).  Medication Reconciliation: complete   JORDAN Gilliland 10/25/2017         "

## 2017-10-25 NOTE — PROGRESS NOTES
"Please see \"Imaging\" tab under \"Chart Review\" for details of today's visit.    Malena Dwyer    "

## 2017-10-25 NOTE — MR AVS SNAPSHOT
After Visit Summary   10/25/2017    Syed Orantes    MRN: [id number]           Patient Information     Date Of Birth          1990        Visit Information        Provider Department      10/25/2017 8:30 AM Malena Dwyer MD [facility] Maternal Fetal Medicine [facility]        Today's Diagnoses     Dichorionic diamniotic twin pregnancy in third trimester    -  1       Follow-ups after your visit        Your next 10 appointments already scheduled     Oct 25, 2017  4:15 PM CDT   ESTABLISHED PRENATAL with Eduardo Arthur MD   [facility]    [address]  [facility] [address]   [phone]            Nov 22, 2017  9:30 AM CST   MFM US COMPRE TWINS F/U with [id number]   [facility] Maternal Fetal Medicine Ultrasound - [place] ([facility])    [address]  [facility] [address] [phone]           Wear comfortable clothes and leave your valuables at home.            Nov 22, 2017 10:00 AM CST   Radiology MD with UR BUBBA ARCINIEGA   [facility] Maternal Fetal Medicine - [facility])    [address]  [facility] [address]   [phone]           Please arrive at the time given for your first appointment. This visit is used internally to schedule the physician's time during your ultrasound.              Future tests that were ordered for you today     Open Future Orders        Priority Expected Expires Ordered    MFM US Comprehensive Twins F/U Routine  8/25/2018 10/25/2017    Maternal Fetal US Comprehensive Twins FU Routine  9/27/2018 9/27/2017            Who to contact     If you have questions or need follow up information about today's clinic visit or your schedule please contact [facility] MATERNAL FETAL MEDICINE [facility] directly at [phone].  Normal or non-critical lab and imaging results will be communicated to you by MyChart, letter or  phone within 4 business days after the clinic has received the results. If you do not hear from us within 7 days, please contact the clinic through [email or web address] or phone. If you have a critical or abnormal lab result, we will notify you by phone as soon as possible.  Submit refill requests through [email or web address] or call your pharmacy and they will forward the refill request to us. Please allow 3 business days for your refill to be completed.          Additional Information About Your Visit        StudyMaxhar[employer] Information     [email or web address] gives you secure access to your electronic health record. If you see a primary care provider, you can also send messages to your care team and make appointments. If you have questions, please call your primary care clinic.  If you do not have a primary care provider, please call [phone] and they will assist you.        Care EveryWhere ID     This is your Care EveryWhere ID. This could be used by other organizations to access your [place] medical records  [id number]        Your Vitals Were     Last Period                   03/09/2017 (Exact Date)            Blood Pressure from Last 3 Encounters:   10/20/17 120/78   10/13/17 125/79   10/04/17 152/80    Weight from Last 3 Encounters:   10/20/17 125.4 kg (276 lb 6.4 oz)   10/13/17 126.3 kg (278 lb 6.4 oz)   10/04/17 124.7 kg (275 lb)               Primary Care Provider Office Phone # Fax #    Gianna Price [phone]       [address] [facility] [address]        Equal Access to Services     [facility]: Hadii aad ku hadasho Soomaali, waaxda luqadaha, qaybta kaalmada adeegyada, boyd krishnamurthy . So [facility] [phone].    ATENCIÓN: Si habla español, tiene a wise disposición servicios gratuitos de asistencia lingüística. Llame al [phone].    We comply with applicable federal civil rights laws and [place] laws. We do not discriminate on the basis of race, color, national origin, age, disability,  sex, sexual orientation, or gender identity.            Thank you!     Thank you for choosing [facility]  for your care. Our goal is always to provide you with excellent care. Hearing back from our patients is one way we can continue to improve our services. Please take a few minutes to complete the written survey that you may receive in the mail after your visit with us. Thank you!             Your Updated Medication List - Protect others around you: Learn how to safely use, store and throw away your medicines at www.disposemymeds.org.          This list is accurate as of: 10/25/17 10:34 AM.  Always use your most recent med list.                   Brand Name Dispense Instructions for use Diagnosis    IRON SUPPLEMENT PO      Take by mouth daily        order for DME     1 Device    One pregnancy belt.    Dichorionic diamniotic twin pregnancy in second trimester, Back pain affecting pregnancy, second trimester       Prenatal Vitamin 27-0.8 MG Tabs     90 tablet    Take 1 tablet by mouth daily    Pregnancy test positive

## 2017-10-26 PROBLEM — O16.3 ELEVATED BLOOD PRESSURE AFFECTING PREGNANCY IN THIRD TRIMESTER, ANTEPARTUM: Status: ACTIVE | Noted: 2017-10-26

## 2017-10-26 NOTE — PROGRESS NOTES
32w6d.    Tired.  No HA, visual changes, N/V   Had MFM visit today.  Report reviewed with patient. Both fetus' are vertex presentation.  Weekly BPP is advised.  She is interested in the repeat c/section.  This will be scheduled for her at future visit.   Visual cervical exam:  Appears to be less dilated than last week.   BPP is elevated.  Might need to consider earlier than 38 weeks due to blood pressure.   RTC 1 week for BPP.   Eduardo Arthur MD

## 2017-10-27 ENCOUNTER — TELEPHONE (OUTPATIENT)
Dept: OBGYN | Facility: CLINIC | Age: 27
End: 2017-10-27

## 2017-10-27 NOTE — TELEPHONE ENCOUNTER
Message handled by Nurse Triage with [facility] - provider name: Dr. Arthur.  Patient needs to go to L & D to be evaluated.   Phone call to patient and she will follow orders from Dr. Arthur. Explained will call Dr. Agbeh to update.  Paged and updated Dr. Agbeh. Gave report. He requested L & D be updated. Phone call to Martha and gave her report. Chana Dewitt RN, BAN

## 2017-10-27 NOTE — TELEPHONE ENCOUNTER
Reason for Call:  Other call back    Detailed comments: Patient states she is having headache medicating on tylenol with no relief and a blood pressure of 148/77. She was advised by Dr. Arthur to call him if these symptoms are present as she is pregnant.     Phone Number Patient can be reached at: Home number on file [phone] (home)    Best Time: any    Can we leave a detailed message on this number? YES    Call taken on 10/27/2017 at 11:13 AM by Boy Aragon

## 2017-10-27 NOTE — TELEPHONE ENCOUNTER
"Return call to patient. She stated h/a started this morning shortly after waking up at 0700. She stated at first she thought it was due to getting up too quickly but that was not the case. She ate a normal diet and waited for h/a to decrease. She reported h/a was about 8/10 and light sensitivity so she took Tylenol 500 mg 2 tabs about 0930 and pain has decreased to 6/10. Patient reports h/a is her whole head \"feels like it is going to pop.\" She stated she works at the [employer] so she had her BP checked in clinic and verified as below. Patient denied other sxs including nausea and visual changes. Patient stated she and Dr. Arthur has discussed BP concerns in the past and was to call if she noted h/a or increased BP.     BP Readings from Last 3 Encounters:   10/25/17 146/82   10/20/17 120/78   10/13/17 125/79     Noted recorded BP was a recheck BP as chart indicates Initial /80     Explained Dr. Arthur is at [facility] and will get him a message and staff will call back with orders asap. Patient stated she is at work now and plans to stay there until she receives orders from Dr. Arthur.   Will route to Dr. Arthur for review & orders. Chana Dewitt RN, BAN      "

## 2017-11-22 ENCOUNTER — TELEPHONE (OUTPATIENT)
Dept: OTHER | Facility: CLINIC | Age: 27
End: 2017-11-22

## 2017-12-22 ENCOUNTER — PRENATAL OFFICE VISIT (OUTPATIENT)
Dept: OBGYN | Facility: CLINIC | Age: 27
End: 2017-12-22
Payer: COMMERCIAL

## 2017-12-22 VITALS
SYSTOLIC BLOOD PRESSURE: 152 MMHG | OXYGEN SATURATION: 97 % | WEIGHT: 250 LBS | DIASTOLIC BLOOD PRESSURE: 92 MMHG | BODY MASS INDEX: 37.46 KG/M2 | HEART RATE: 79 BPM

## 2017-12-22 DIAGNOSIS — Z30.011 INITIATION OF OCP (BCP): ICD-10-CM

## 2017-12-22 PROBLEM — O30.049 DICHORIONIC DIAMNIOTIC TWIN GESTATION: Status: RESOLVED | Noted: 2017-06-09 | Resolved: 2017-12-22

## 2017-12-22 PROCEDURE — 99207 ZZC POST PARTUM EXAM: CPT | Performed by: OBSTETRICS & GYNECOLOGY

## 2017-12-22 RX ORDER — ACETAMINOPHEN AND CODEINE PHOSPHATE 120; 12 MG/5ML; MG/5ML
1 SOLUTION ORAL DAILY
Qty: 84 TABLET | Refills: 4 | Status: SHIPPED | OUTPATIENT
Start: 2017-12-22 | End: 2021-10-11

## 2017-12-22 ASSESSMENT — PATIENT HEALTH QUESTIONNAIRE - PHQ9: SUM OF ALL RESPONSES TO PHQ QUESTIONS 1-9: 0

## 2017-12-22 NOTE — MR AVS SNAPSHOT
After Visit Summary   12/22/2017    Syed Orantes    MRN: [id number]           Patient Information     Date Of Birth          1990        Visit Information        Provider Department      12/22/2017 8:30 AM Eduardo Arthur MD [facility]        Today's Diagnoses     Routine postpartum follow-up    -  1    Initiation of OCP (BCP)           Follow-ups after your visit        Follow-up notes from your care team     Return in about 1 year (around 12/22/2018) for Physical Exam.      Who to contact     If you have questions or need follow up information about today's clinic visit or your schedule please contact [facility] directly at [phone].  Normal or non-critical lab and imaging results will be communicated to you by MyChart, letter or phone within 4 business days after the clinic has received the results. If you do not hear from us within 7 days, please contact the clinic through Algenetixhart or phone. If you have a critical or abnormal lab result, we will notify you by phone as soon as possible.  Submit refill requests through Zurrba or call your pharmacy and they will forward the refill request to us. Please allow 3 business days for your refill to be completed.          Additional Information About Your Visit        MyChart Information     Zurrba gives you secure access to your electronic health record. If you see a primary care provider, you can also send messages to your care team and make appointments. If you have questions, please call your primary care clinic.  If you do not have a primary care provider, please call [phone] and they will assist you.        Care EveryWhere ID     This is your Care EveryWhere ID. This could be used by other organizations to access your [place] medical records  [id number]        Your Vitals Were     Pulse Pulse Oximetry Breastfeeding? BMI (Body Mass Index)          79 97% Yes 37.46 kg/m2         Blood Pressure  from Last 3 Encounters:   12/22/17 (!) 152/92   10/25/17 146/82   10/20/17 120/78    Weight from Last 3 Encounters:   12/22/17 250 lb (113.4 kg)   10/25/17 278 lb 9.6 oz (126.4 kg)   10/20/17 276 lb 6.4 oz (125.4 kg)              Today, you had the following     No orders found for display         Today's Medication Changes          These changes are accurate as of: 12/22/17  9:10 AM.  If you have any questions, ask your nurse or doctor.               Start taking these medicines.        Dose/Directions    norethindrone 0.35 MG per tablet   Commonly known as:  MICRONOR   Used for:  Initiation of OCP (BCP)   Started by:  Eduardo Arthur MD        Dose:  1 tablet   Take 1 tablet (0.35 mg) by mouth daily   Quantity:  84 tablet   Refills:  4            Where to get your medicines      These medications were sent to [facility]/pharmacy #[id number] - [place] [address] [facility] [address]     Phone:  [phone]     norethindrone 0.35 MG per tablet                Primary Care Provider Office Phone # Fax #    Gianna Price [phone] [address] [facility] [address]        Equal Access to Services     [facility] AH: Hadii gela hawk hadlolao Sofay, waaxda luqadaha, qaybta kaalmada adeegyada, boyd krishnamurthy . So [facility] [phone].    ATENCIÓN: Si habla español, tiene a wise disposición servicios gratuitos de asistencia lingüística. Llame al [phone].    We comply with applicable federal civil rights laws and [place] laws. We do not discriminate on the basis of race, color, national origin, age, disability, sex, sexual orientation, or gender identity.            Thank you!     Thank you for choosing [facility]  for your care. Our goal is always to provide you with excellent care. Hearing back from our patients is one way we can continue to improve our services. Please take a few minutes to complete the  written survey that you may receive in the mail after your visit with us. Thank you!             Your Updated Medication List - Protect others around you: Learn how to safely use, store and throw away your medicines at www.disposemymeds.org.          This list is accurate as of: 12/22/17  9:10 AM.  Always use your most recent med list.                   Brand Name Dispense Instructions for use Diagnosis    IRON SUPPLEMENT PO      Take by mouth daily        norethindrone 0.35 MG per tablet    MICRONOR    84 tablet    Take 1 tablet (0.35 mg) by mouth daily    Initiation of OCP (BCP)       order for DME     1 Device    One pregnancy belt.    Dichorionic diamniotic twin pregnancy in second trimester, Back pain affecting pregnancy, second trimester       Prenatal Vitamin 27-0.8 MG Tabs     90 tablet    Take 1 tablet by mouth daily    Pregnancy test positive

## 2017-12-22 NOTE — PROGRESS NOTES
POSTPARTUM VISIT:    Delivery Information:    Date:  10/27/2017  Route:  Repeat  section, twins.   Reviewed pregnancy and birth.  Doing well.  No significant symptoms.  Infant doing fine.  Breast feeding:  yes  Bottle:  yes  Formula:  yes    Exam:  BP (!) 152/92 (BP Location: Right arm, Cuff Size: Adult Regular)  Pulse 79  Wt 250 lb (113.4 kg)  SpO2 97%  Breastfeeding? Yes  BMI 37.46 kg/m2  PHQ-9 = No Value exists for the : HP#PHQ9  General:  WNWD female, NAD  Alert  Oriented x 3  Gait:  Normal  Skin:  Normal skin turgor  HEENT:  NC/AT, EOMI  Pelvic exam:  declined    Reviewed contraception plans.  We reviewed the options available, the side effects, risks, benefits and instructions on proper use.    Prescription for Micronor sent to pharmacy.  She will consider the other options.    Continue general medical care.  Repeat the blood pressure in a couple of weeks.  If still elevated, then see FP.

## 2018-03-21 ENCOUNTER — OFFICE VISIT (OUTPATIENT)
Dept: PLASTIC SURGERY | Facility: CLINIC | Age: 28
End: 2018-03-21
Payer: COMMERCIAL

## 2018-03-21 VITALS
BODY MASS INDEX: 39.51 KG/M2 | HEART RATE: 85 BPM | OXYGEN SATURATION: 100 % | HEIGHT: 67 IN | DIASTOLIC BLOOD PRESSURE: 74 MMHG | SYSTOLIC BLOOD PRESSURE: 134 MMHG | WEIGHT: 251.7 LBS

## 2018-03-21 DIAGNOSIS — L98.7 EXCESS SKIN OF ABDOMINAL WALL: Primary | ICD-10-CM

## 2018-03-21 NOTE — PROGRESS NOTES
REFERRING PHYSICIAN:  Gianna Price MD, primary care physician      PRESENTING COMPLAINT:  Consultation for abdominal contouring surgery.      HISTORY OF PRESENT ILLNESS:  Ms. Orantes is 28 years old.  In 10/2017 she had twins.  She has subsequently developed a lot of loose skin in her anterior abdominal wall along with some adiposity that she would like to get rid of it.  Additionally, she has been having some raw areas in the intertriginous areas for which she has been doing conservative management.  She also has been exercising 3 times a week and has been successful in losing weight.  She is done having children.  She is here to discuss options for improving the contours of her abdomen.      PAST MEDICAL HISTORY:  Nil.      PAST SURGICAL HISTORY:     1. Breast reduction.   2. Cholecystectomy, laparoscopic.   3. Two C-sections.      MEDICATIONS:  Nil.      ALLERGIES:  Amoxicillin and penicillin.      SOCIAL HISTORY:  Does not smoke, does not drink.  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  Lives in [place].      REVIEW OF SYSTEMS:  Denies chest pain, shortness of breath, MI, CVA, DVT and PE.      PHYSICAL EXAMINATION:  On exam vital signs stable.  She is afebrile in no obvious distress.  She is 5 feet 8-1/2 inches, 246 pounds with BMI 37 kg/M2.  On examination of her abdomen, she has a poochy anterior abdominal wall with a skin pinch thickness of over 8-10 cm.  She has significant stria in the mid and lower abdominal areas.  She has a small umbilical hernia.  She otherwise has well-healed laparoscopic scars and a Pfannenstiel scar.  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      ASSESSMENT AND PLAN:  Based on above findings, a diagnosis of history of twins, obesity with a history of losing weight along with the lower abdominal panniculus and excess skin at the abdominal wall and umbilical hernia was made.  I had a long discussion with the patient about her  findings.  I think from an aesthetic standpoint, the best course of action would be for the patient to actively lose weight, try and get her BMI below 30 and also have her hernia worked up and possibly fixed laparoscopically/mainly an open technique followed by an abdominoplasty down the road.  If she proceeded with some of abdominal contouring surgery today, I would recommend a panniculectomy.  She will need prior authorization but it is unlikely to be approved given the fact that her panniculus does not hang below the mons pubis/pubic bone.  I explained to the patient that this would be a private pay situation.  We will send her quotes when the time is right and the patient wants to proceed.  She is more interested in abdominoplasty and wants to lose weight first.  She will follow up with her medical weight .  All questions were answered.  She was happy with the visit.  All exam done in the presence of a female chaperone.  Look forward to helping out in the near future.      Total time spent with patient 30 minutes, more than half was counseling.      cc:   Gianna Price MD   [place] [id number]

## 2018-03-21 NOTE — MR AVS SNAPSHOT
"              After Visit Summary   3/21/2018    Syed Orantes    MRN: [id number]           Patient Information     Date Of Birth          1990        Visit Information        Provider Department      3/21/2018 8:00 AM JOAN Jensen MD [facility] Plastic and Reconstructive Surgery        Today's Diagnoses     Excess skin of abdominal wall    -  1       Follow-ups after your visit        Follow-up notes from your care team     Return if symptoms worsen or fail to improve.      Who to contact     Please call your clinic at [phone] to:    Ask questions about your health    Make or cancel appointments    Discuss your medicines    Learn about your test results    Speak to your doctor            Additional Information About Your Visit        [employer] Information     [employer] gives you secure access to your electronic health record. If you see a primary care provider, you can also send messages to your care team and make appointments. If you have questions, please call your primary care clinic.  If you do not have a primary care provider, please call [phone] and they will assist you.      [employer] is an electronic gateway that provides easy, online access to your medical records. With [employer], you can request a clinic appointment, read your test results, renew a prescription or communicate with your care team.     To access your existing account, please contact your [facility] or call [phone] for assistance.        Care EveryWhere ID     This is your Care EveryWhere ID. This could be used by other organizations to access your [place] medical records  [id number]        Your Vitals Were     Pulse Height Pulse Oximetry BMI (Body Mass Index)          85 5' 7\" 100% 39.42 kg/m2         Blood Pressure from Last 3 Encounters:   03/21/18 134/74   12/22/17 (!) 152/92   10/25/17 146/82    Weight from Last 3 Encounters:   03/21/18 251 lb 11.2 oz   12/22/17 250 lb "   10/25/17 278 lb 9.6 oz              Today, you had the following     No orders found for display       Primary Care Provider Office Phone # Fax #    Gianna Price [phone] [address]  [place] [address]        Equal Access to Services     [facility] : Hadii gela ku hadasho Soomaali, waaxda luqadaha, qaybta kaalmada adeegyada, waxcarlie ferguson macezequiel espinozajaneomar krishnamurthy . So [facility] [phone].    ATENCIÓN: Si habla español, tiene a wise disposición servicios gratuitos de asistencia lingüística. Llame al [phone].    We comply with applicable federal civil rights laws and [place] laws. We do not discriminate on the basis of race, color, national origin, age, disability, sex, sexual orientation, or gender identity.            Thank you!     Thank you for choosing [facility] PLASTIC AND RECONSTRUCTIVE SURGERY  for your care. Our goal is always to provide you with excellent care. Hearing back from our patients is one way we can continue to improve our services. Please take a few minutes to complete the written survey that you may receive in the mail after your visit with us. Thank you!             Your Updated Medication List - Protect others around you: Learn how to safely use, store and throw away your medicines at www.disposemymeds.org.          This list is accurate as of 3/21/18 10:21 AM.  Always use your most recent med list.                   Brand Name Dispense Instructions for use Diagnosis    IRON SUPPLEMENT PO      Take by mouth daily        norethindrone 0.35 MG per tablet    MICRONOR    84 tablet    Take 1 tablet (0.35 mg) by mouth daily    Initiation of OCP (BCP)       order for DME     1 Device    One pregnancy belt.    Dichorionic diamniotic twin pregnancy in second trimester, Back pain affecting pregnancy, second trimester       Prenatal Vitamin 27-0.8 MG Tabs     90 tablet    Take 1 tablet by mouth daily    Pregnancy test positive

## 2018-03-21 NOTE — LETTER
3/21/2018       RE: Syed Orantes  [address] [facility] [address]     Dear Colleague,    Thank you for referring your patient, Syed Orantes, to the [facility] PLASTIC AND RECONSTRUCTIVE SURGERY at [facility]. Please see a copy of my visit note below.    REFERRING PHYSICIAN:  Gianna Price MD, primary care physician      PRESENTING COMPLAINT:  Consultation for abdominal contouring surgery.      HISTORY OF PRESENT ILLNESS:  Ms. Orantes is 28 years old.  In 10/2017 she had twins.  She has subsequently developed a lot of loose skin in her anterior abdominal wall along with some adiposity that she would like to get rid of it.  Additionally, she has been having some raw areas in the intertriginous areas for which she has been doing conservative management.  She also has been exercising 3 times a week and has been successful in losing weight.  She is done having children.  She is here to discuss options for improving the contours of her abdomen.      PAST MEDICAL HISTORY:  Nil.      PAST SURGICAL HISTORY:     1. Breast reduction.   2. Cholecystectomy, laparoscopic.   3. Two C-sections.      MEDICATIONS:  Nil.      ALLERGIES:  Amoxicillin and penicillin.      SOCIAL HISTORY:  Does not smoke, does not drink.  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  Lives in [place].      REVIEW OF SYSTEMS:  Denies chest pain, shortness of breath, MI, CVA, DVT and PE.      PHYSICAL EXAMINATION:  On exam vital signs stable.  She is afebrile in no obvious distress.  She is 5 feet 8-1/2 inches, 246 pounds with BMI 37 kg/M2.  On examination of her abdomen, she has a poochy anterior abdominal wall with a skin pinch thickness of over 8-10 cm.  She has significant stria in the mid and lower abdominal areas.  She has a small umbilical hernia.  She otherwise has well-healed laparoscopic scars and a Pfannenstiel scar.  Her lower abdominal panniculus is minor that  hangs above the mons pubis.      ASSESSMENT AND PLAN:  Based on above findings, a diagnosis of history of twins, obesity with a history of losing weight along with the lower abdominal panniculus and excess skin at the abdominal wall and umbilical hernia was made.  I had a long discussion with the patient about her findings.  I think from an aesthetic standpoint, the best course of action would be for the patient to actively lose weight, try and get her BMI below 30 and also have her hernia worked up and possibly fixed laparoscopically/mainly an open technique followed by an abdominoplasty down the road.  If she proceeded with some of abdominal contouring surgery today, I would recommend a panniculectomy.  She will need prior authorization but it is unlikely to be approved given the fact that her panniculus does not hang below the mons pubis/pubic bone.  I explained to the patient that this would be a private pay situation.  We will send her quotes when the time is right and the patient wants to proceed.  She is more interested in abdominoplasty and wants to lose weight first.  She will follow up with her medical weight .  All questions were answered.  She was happy with the visit.  All exam done in the presence of a female chaperone.  Look forward to helping out in the near future.      Total time spent with patient 30 minutes, more than half was counseling.        Again, thank you for allowing me to participate in the care of your patient.      Sincerely,    JOAN Jensen MD    cc:   [place] [address]

## 2018-03-21 NOTE — NURSING NOTE
"Chief Complaint   Patient presents with     Consult     tummy tuck        Vitals:    03/21/18 0758   BP: 134/74   Pulse: 85   SpO2: 100%   Weight: 251 lb 11.2 oz   Height: 5' 7\"       Body mass index is 39.42 kg/(m^2).      WOUND EVALUATION:                        "

## 2020-03-02 ENCOUNTER — HEALTH MAINTENANCE LETTER (OUTPATIENT)
Age: 30
End: 2020-03-02

## 2020-12-14 ENCOUNTER — HEALTH MAINTENANCE LETTER (OUTPATIENT)
Age: 30
End: 2020-12-14

## 2021-04-08 ENCOUNTER — IMMUNIZATION (OUTPATIENT)
Dept: NURSING | Facility: CLINIC | Age: 31
End: 2021-04-08
Payer: COMMERCIAL

## 2021-04-08 PROCEDURE — 0001A PR COVID VAC PFIZER DIL RECON 30 MCG/0.3 ML IM: CPT

## 2021-04-08 PROCEDURE — 91300 PR COVID VAC PFIZER DIL RECON 30 MCG/0.3 ML IM: CPT

## 2021-04-18 ENCOUNTER — HEALTH MAINTENANCE LETTER (OUTPATIENT)
Age: 31
End: 2021-04-18

## 2021-04-29 ENCOUNTER — IMMUNIZATION (OUTPATIENT)
Dept: NURSING | Facility: CLINIC | Age: 31
End: 2021-04-29
Attending: INTERNAL MEDICINE
Payer: COMMERCIAL

## 2021-04-29 PROCEDURE — 0002A PR COVID VAC PFIZER DIL RECON 30 MCG/0.3 ML IM: CPT

## 2021-04-29 PROCEDURE — 91300 PR COVID VAC PFIZER DIL RECON 30 MCG/0.3 ML IM: CPT

## 2021-10-02 ENCOUNTER — HEALTH MAINTENANCE LETTER (OUTPATIENT)
Age: 31
End: 2021-10-02

## 2021-10-07 ENCOUNTER — OFFICE VISIT (OUTPATIENT)
Dept: INTERNAL MEDICINE | Facility: CLINIC | Age: 31
End: 2021-10-07
Payer: COMMERCIAL

## 2021-10-07 DIAGNOSIS — H66.90 EAR INFECTION: Primary | ICD-10-CM

## 2021-10-07 PROCEDURE — 99203 OFFICE O/P NEW LOW 30 MIN: CPT | Performed by: INTERNAL MEDICINE

## 2021-10-07 RX ORDER — AZITHROMYCIN 250 MG/1
TABLET, FILM COATED ORAL
Qty: 6 TABLET | Refills: 0 | Status: SHIPPED | OUTPATIENT
Start: 2021-10-07 | End: 2021-10-12

## 2021-10-07 NOTE — PROGRESS NOTES
HPI:    Ms. Orantes comes in with about 3 days of R ear pain. No ear drainage, no systemic sxs. No sore throat. No proceeding URI sxs. She states she is not pregnant and has an IUD. Offered pregnancy test before antibiotics and she declined.     Past Medical History:   Diagnosis Date     Cholelithiasis and acute cholecystitis with obstruction      Cholelithiasis without obstruction 2011     Personal history of asthma     her asthma went away after she became active in high school sports.      Past Surgical History:   Procedure Laterality Date      SECTION       CHOLECYSTECTOMY, LAPOROSCOPIC       COSMETIC SURGERY      breast reduction      HC INSERT IMPLANTABLE CONTRACEPTIVE CAPSULE       HC INSERTION INTRAUTERINE DEVICE       HC REMOVAL IMPLATABLE CONTRACEPTIVE CAPSULE       HC REMOVE INTRAUTERINE DEVICE       PE:    Limited exam. R ear quite erythematous on the tympanic membrane and surrounding the tympanic membrane. L ear with wax.    A/P:    1. Otitis; she is allergic to PCN/Augmentin. Sent in Rx. For Azithromycin. She can try a decongestant as well. She is traveling to [facility] next week.

## 2021-10-08 ENCOUNTER — OFFICE VISIT (OUTPATIENT)
Dept: OTOLARYNGOLOGY | Facility: CLINIC | Age: 31
End: 2021-10-08
Payer: COMMERCIAL

## 2021-10-08 VITALS
TEMPERATURE: 97.8 F | OXYGEN SATURATION: 99 % | WEIGHT: 255.73 LBS | HEIGHT: 69 IN | BODY MASS INDEX: 37.88 KG/M2 | HEART RATE: 87 BPM

## 2021-10-08 DIAGNOSIS — H73.011 BULLOUS MYRINGITIS OF RIGHT EAR: Primary | ICD-10-CM

## 2021-10-08 PROCEDURE — 92504 EAR MICROSCOPY EXAMINATION: CPT | Performed by: REGISTERED NURSE

## 2021-10-08 PROCEDURE — 99203 OFFICE O/P NEW LOW 30 MIN: CPT | Mod: 25 | Performed by: REGISTERED NURSE

## 2021-10-08 RX ORDER — CIPROFLOXACIN AND DEXAMETHASONE 3; 1 MG/ML; MG/ML
SUSPENSION/ DROPS AURICULAR (OTIC)
Qty: 7.5 ML | Refills: 0 | Status: SHIPPED | OUTPATIENT
Start: 2021-10-08 | End: 2021-10-18

## 2021-10-08 ASSESSMENT — MIFFLIN-ST. JEOR: SCORE: 1929.38

## 2021-10-08 NOTE — PROGRESS NOTES
Otolaryngology Clinic  2021    Chief Complaint:   Right ear pain      History of Present Illness:   Syed Orantes is a 31 year old female who presents today for evaluation of left ear pain. Patient was seen by Dr. Barnett in Primary Care yesterday who noted a bulging left TM. Patient is PCN allergic and was prescribed a Zpack. She comes to clinic today for worsening ear pain. Difficulty sleeping last night due to pain. Ibuprofen not helping. Denies any drainage or hearing change.     Patient denies any dizziness, tinnitus, hearing loss, facial numbness/weakness, history of frequent ear infections, or ear surgeries.     Past Medical History:  Past Medical History:   Diagnosis Date     Cholelithiasis and acute cholecystitis with obstruction      Cholelithiasis without obstruction 2011     Personal history of asthma     her asthma went away after she became active in high school sports.        Past Surgical History:  Past Surgical History:   Procedure Laterality Date      SECTION       CHOLECYSTECTOMY, LAPOROSCOPIC       COSMETIC SURGERY      breast reduction      HC INSERT IMPLANTABLE CONTRACEPTIVE CAPSULE       HC INSERTION INTRAUTERINE DEVICE       HC REMOVAL IMPLATABLE CONTRACEPTIVE CAPSULE       HC REMOVE INTRAUTERINE DEVICE       Medications:  Current Outpatient Medications   Medication Sig Dispense Refill     azithromycin (ZITHROMAX) 250 MG tablet Take 2 tablets (500 mg) by mouth daily for 1 day, THEN 1 tablet (250 mg) daily for 4 days. 6 tablet 0     ciprofloxacin-dexamethasone (CIPRODEX) 0.3-0.1 % otic suspension Instill 5 drops into the right ear twice daily for 10 days 7.5 mL 0     Ferrous Sulfate (IRON SUPPLEMENT PO) Take by mouth daily       norethindrone (MICRONOR) 0.35 MG per tablet Take 1 tablet (0.35 mg) by mouth daily (Patient not taking: Reported on 10/8/2021) 84 tablet 4     order for DME One pregnancy belt. 1 Device 0     Prenatal Vit-Fe Fumarate-FA  "(PRENATAL VITAMIN) 27-0.8 MG TABS Take 1 tablet by mouth daily (Patient not taking: Reported on 10/8/2021) 90 tablet 3     Allergies:  Allergies   Allergen Reactions     Amoxicillin Hives            Pcn [Penicillin G] Hives      Social History:  Social History     Tobacco Use     Smoking status: Never Smoker     Smokeless tobacco: Never Used   Substance Use Topics     Alcohol use: Yes     Comment: socially -not in PG     Drug use: No     ROS: 10 point ROS neg other than the symptoms noted above in the HPI.    Physical Exam:    Pulse 87   Temp 97.8  F (36.6  C) (Temporal)   Ht 1.753 m (5' 9\")   Wt 116 kg (255 lb 11.7 oz)   SpO2 99%   BMI 37.77 kg/m       Constitutional:  The patient was unaccompanied, well-groomed, and in no acute distress.     Neurologic: Alert and oriented x 3.  HB 1/6.  Voice normal.    Psychiatric: The patient's affect was calm, cooperative, and appropriate.     Communication:  Normal; communicates verbally, normal voice quality.    Respiratory: Breathing comfortably without stridor or exertion of accessory muscles.    Eyes: Pupils were equal and reactive.  Extraocular movement intact.    Ears: Pinnae and tragus non-tender.        Otologic microscope exam:    Patient's ear pathology required use of the binocular microscope for the purpose of cleaning and improving visualization in order to assure a more accurate diagnostic evaluation.    Right ear was examined under the microscope.  Blistering present on middle of drum over umbo. 3 smaller blisters noted on inferior TM at annulus. Nicely aerated middle ear space.    Left ear was also examined under the microscope.  Normal appearing TM, nicely aerated middle ear space.       Assessment and Plan:  1. Bullous myringitis of right ear  Patient with bullous myringitis of the right ear. Patient to continue on azithromycin. Will add ciprodex drops to regimen. Patient to follow up in clinic next week for ear recheck prior to flying to [place] next " Wednesday.     - ciprofloxacin-dexamethasone (CIPRODEX) 0.3-0.1 % otic suspension; Instill 5 drops into the right ear twice daily for 10 days  Dispense: 7.5 mL; Refill: 0     Sindy Estrella DNP, APRN, CNP  Otolaryngology  Head & Neck Surgery  [phone]    30 minutes spent on the date of the encounter doing chart review, history and exam, documentation and further activities per the note

## 2021-10-08 NOTE — LETTER
10/8/2021       RE: Syed Orantes  [address] [facility] [address]     Dear Colleague,    Thank you for referring your patient, Syed Orantes, to the [facility] EAR NOSE AND THROAT CLINIC [place] at [facility]. Please see a copy of my visit note below.      Otolaryngology Clinic  2021    Chief Complaint:   Right ear pain      History of Present Illness:   Syed Orantes is a 31 year old female who presents today for evaluation of left ear pain. Patient was seen by Dr. Barnett in Primary Care yesterday who noted a bulging left TM. Patient is PCN allergic and was prescribed a Zpack. She comes to clinic today for worsening ear pain. Difficulty sleeping last night due to pain. Ibuprofen not helping. Denies any drainage or hearing change.     Patient denies any dizziness, tinnitus, hearing loss, facial numbness/weakness, history of frequent ear infections, or ear surgeries.     Past Medical History:  Past Medical History:   Diagnosis Date     Cholelithiasis and acute cholecystitis with obstruction      Cholelithiasis without obstruction 2011     Personal history of asthma     her asthma went away after she became active in high school sports.        Past Surgical History:  Past Surgical History:   Procedure Laterality Date      SECTION       CHOLECYSTECTOMY, LAPOROSCOPIC       COSMETIC SURGERY      breast reduction      HC INSERT IMPLANTABLE CONTRACEPTIVE CAPSULE       HC INSERTION INTRAUTERINE DEVICE       HC REMOVAL IMPLATABLE CONTRACEPTIVE CAPSULE       HC REMOVE INTRAUTERINE DEVICE       Medications:  Current Outpatient Medications   Medication Sig Dispense Refill     azithromycin (ZITHROMAX) 250 MG tablet Take 2 tablets (500 mg) by mouth daily for 1 day, THEN 1 tablet (250 mg) daily for 4 days. 6 tablet 0     ciprofloxacin-dexamethasone (CIPRODEX) 0.3-0.1 % otic suspension Instill 5 drops  "into the right ear twice daily for 10 days 7.5 mL 0     Ferrous Sulfate (IRON SUPPLEMENT PO) Take by mouth daily       norethindrone (MICRONOR) 0.35 MG per tablet Take 1 tablet (0.35 mg) by mouth daily (Patient not taking: Reported on 10/8/2021) 84 tablet 4     order for DME One pregnancy belt. 1 Device 0     Prenatal Vit-Fe Fumarate-FA (PRENATAL VITAMIN) 27-0.8 MG TABS Take 1 tablet by mouth daily (Patient not taking: Reported on 10/8/2021) 90 tablet 3     Allergies:  Allergies   Allergen Reactions     Amoxicillin Hives            Pcn [Penicillin G] Hives      Social History:  Social History     Tobacco Use     Smoking status: Never Smoker     Smokeless tobacco: Never Used   Substance Use Topics     Alcohol use: Yes     Comment: socially -not in PG     Drug use: No     ROS: 10 point ROS neg other than the symptoms noted above in the HPI.    Physical Exam:    Pulse 87   Temp 97.8  F (36.6  C) (Temporal)   Ht 1.753 m (5' 9\")   Wt 116 kg (255 lb 11.7 oz)   SpO2 99%   BMI 37.77 kg/m       Constitutional:  The patient was unaccompanied, well-groomed, and in no acute distress.     Neurologic: Alert and oriented x 3.  HB 1/6.  Voice normal.    Psychiatric: The patient's affect was calm, cooperative, and appropriate.     Communication:  Normal; communicates verbally, normal voice quality.    Respiratory: Breathing comfortably without stridor or exertion of accessory muscles.    Eyes: Pupils were equal and reactive.  Extraocular movement intact.    Ears: Pinnae and tragus non-tender.        Otologic microscope exam:    Patient's ear pathology required use of the binocular microscope for the purpose of cleaning and improving visualization in order to assure a more accurate diagnostic evaluation.    Right ear was examined under the microscope.  Blistering present on middle of drum over umbo. 3 smaller blisters noted on inferior TM at annulus. Nicely aerated middle ear space.    Left ear was also examined under the " microscope.  Normal appearing TM, nicely aerated middle ear space.       Assessment and Plan:  1. Bullous myringitis of right ear  Patient with bullous myringitis of the right ear. Patient to continue on azithromycin. Will add ciprodex drops to regimen. Patient to follow up in clinic next week for ear recheck prior to flying to [facility] next Wednesday.     - ciprofloxacin-dexamethasone (CIPRODEX) 0.3-0.1 % otic suspension; Instill 5 drops into the right ear twice daily for 10 days  Dispense: 7.5 mL; Refill: 0     Sindy Estrella DNP, APRN, CNP  Otolaryngology  Head & Neck Surgery  [phone]    30 minutes spent on the date of the encounter doing chart review, history and exam, documentation and further activities per the note          Again, thank you for allowing me to participate in the care of your patient.      Sincerely,    Dinah Estrella, NP

## 2022-03-31 ENCOUNTER — ANCILLARY PROCEDURE (OUTPATIENT)
Dept: GENERAL RADIOLOGY | Facility: CLINIC | Age: 32
End: 2022-03-31
Attending: FAMILY MEDICINE
Payer: COMMERCIAL

## 2022-03-31 ENCOUNTER — OFFICE VISIT (OUTPATIENT)
Dept: ORTHOPEDICS | Facility: CLINIC | Age: 32
End: 2022-03-31
Payer: COMMERCIAL

## 2022-03-31 VITALS — WEIGHT: 220 LBS | HEIGHT: 69 IN | BODY MASS INDEX: 32.58 KG/M2

## 2022-03-31 DIAGNOSIS — S90.811A ABRASION, FOOT, RIGHT, INITIAL ENCOUNTER: ICD-10-CM

## 2022-03-31 DIAGNOSIS — M25.571 RIGHT ANKLE PAIN: ICD-10-CM

## 2022-03-31 DIAGNOSIS — M25.561 RIGHT KNEE PAIN: ICD-10-CM

## 2022-03-31 DIAGNOSIS — S93.409A SPRAIN OF LATERAL LIGAMENT OF ANKLE JOINT: Primary | ICD-10-CM

## 2022-03-31 DIAGNOSIS — S83.411A SPRAIN OF MEDIAL COLLATERAL LIGAMENT OF RIGHT KNEE, INITIAL ENCOUNTER: ICD-10-CM

## 2022-03-31 PROCEDURE — 73610 X-RAY EXAM OF ANKLE: CPT | Mod: RT | Performed by: RADIOLOGY

## 2022-03-31 PROCEDURE — 73562 X-RAY EXAM OF KNEE 3: CPT | Mod: RT | Performed by: RADIOLOGY

## 2022-03-31 PROCEDURE — 99202 OFFICE O/P NEW SF 15 MIN: CPT | Performed by: FAMILY MEDICINE

## 2022-03-31 PROCEDURE — 73630 X-RAY EXAM OF FOOT: CPT | Mod: RT | Performed by: RADIOLOGY

## 2022-03-31 NOTE — LETTER
"  3/31/2022      RE: Syed Orantes  [address] [facility] [address]       Sports Medicine Clinic Visit    PCP: Gianna Price    Syed Orantes is a 32 year old female who is seen  as self referral presenting with right foot/knee pain    Injury: Pt slipped on ice into the splits. Striking her dorsum right foot and twisting knee.     Location of Pain: right dorsal midfoot, anterior knee  Duration of Pain: 1 day(s)  Rating of Pain: 5/10  Pain is better with: ice  Pain is worse with: walking  Additional Features: swelling  Treatment so far consists of: Nothing  Prior History of related problems: none    Ht 1.753 m (5' 9\")   Wt 99.8 kg (220 lb)   BMI 32.49 kg/m         PMH:  Past Medical History:   Diagnosis Date     Cholelithiasis and acute cholecystitis with obstruction 2011     Cholelithiasis without obstruction 11/23/2011     Personal history of asthma     her asthma went away after she became active in high school sports.        Active problem list:  Patient Active Problem List   Diagnosis     CARDIOVASCULAR SCREENING; LDL GOAL LESS THAN 160     Acne     Menorrhagia     Primary anovulatory infertility     Female infertility     Pregnancy with history of infertility     Pregnancy with history of infertility, second trimester     Dizygotic twins     Elevated blood pressure affecting pregnancy in third trimester, antepartum     Excess skin of abdominal wall       FH:  Family History   Problem Relation Age of Onset     Hypertension Mother      Thyroid Disease Maternal Grandmother      Colon Cancer No family hx of      Breast Cancer No family hx of        SH:  Social History     Socioeconomic History     Marital status:      Spouse name: Shaquille     Number of children: 1     Years of education: 14     Highest education level: Not on file   Occupational History     Occupation: [employer]     Employer: [employer]      Comment: [employer]   Tobacco Use     Smoking status: Never Smoker     " Smokeless tobacco: Never Used   Substance and Sexual Activity     Alcohol use: Yes     Comment: socially -not in PG     Drug use: No     Sexual activity: Yes     Partners: Male   Other Topics Concern     Parent/sibling w/ CABG, MI or angioplasty before 65F 55M? No   Social History Narrative     Not on file     Social Determinants of Health     Financial Resource Strain: Not on file   Food Insecurity: Not on file   Transportation Needs: Not on file   Physical Activity: Not on file   Stress: Not on file   Social Connections: Not on file   Intimate Partner Violence: Not on file   Housing Stability: Not on file       MEDS:  See EMR, reviewed  ALL:  See EMR, reviewed    REVIEW OF SYSTEMS:  CONSTITUTIONAL:NEGATIVE for fever, chills, change in weight  INTEGUMENTARY/SKIN: NEGATIVE for worrisome rashes, moles or lesions  EYES: NEGATIVE for vision changes or irritation  ENT/MOUTH: NEGATIVE for ear, mouth and throat problems  RESP:NEGATIVE for significant cough or SOB  BREAST: NEGATIVE for masses, tenderness or discharge  CV: NEGATIVE for chest pain, palpitations or peripheral edema  GI: NEGATIVE for nausea, abdominal pain, heartburn, or change in bowel habits  :NEGATIVE for frequency, dysuria, or hematuria  :NEGATIVE for frequency, dysuria, or hematuria  NEURO: NEGATIVE for weakness, dizziness or paresthesias  ENDOCRINE: NEGATIVE for temperature intolerance, skin/hair changes  HEME/ALLERGY/IMMUNE: NEGATIVE for bleeding problems  PSYCHIATRIC: NEGATIVE for changes in mood or affect      Objective: Normal range of motion at the right hip to internal rotation external rotation abduction.  Normal strength about the right hip to adduction and abduction against resistance.  Can do an active straight leg raise with no extension lag.  No effusion at the right knee.  I can flex and extend the right knee fully.  Mildly tender along the course of the MCL.  Gentle stressing of the MCL is associated with a firm endpoint but some  discomfort.  Nontender about the LCL.  LCL stress testing is negative.  Nontender over the medial or lateral joint line.  Patellar apprehension signs are negative.  Anterior posterior drawer is negative.  Lachman's appears to have a firm endpoint.  Nontender over the proximal fibula.  Tenderness over the bony distal fibula.  Tender at the anterior talofibular ligament, calcaneofibular ligaments, nontender over the posterior talofibular ligament.  Nontender over the syndesmosis.  Nontender over the deltoid ligament.  Nontender over the bony distal tibia.  No defects palpated along the Achilles tendon.  She has an abrasion over the dorsal midfoot.  Skin is otherwise intact.  No signs of cellulitis.  Mild swelling over the dorsal midfoot.  Mild tenderness over the dorsal navicular.  Nontender over the proximal fifth metatarsal or fourth metatarsal.  Nontender in the area of Lisfranc.  Sensation is intact distally.  Appropriate conversation affect.      We went over x-rays of the knee, ankle and foot that show no acute fracture.    Assessment right-sided knee MCL sprain.  Right-sided ankle lateral ankle sprain.  Abrasion dorsal midfoot.    Plan: Walking boot offered for comfort, declined.  Lace up ankle brace offered.  Early range of motion exercises to the ankle.  Look for improvements of the knee symptoms over the next 3 weeks.  Treatment to the abrasion with soapy water explained.  She will watch for signs of secondary infection.  She will look for improvements with her symptoms over the next 3 weeks and follow-up this is not the case.    Antonio Rodrigez MD

## 2022-03-31 NOTE — PROGRESS NOTES
"Sports Medicine Clinic Visit    PCP: Gianna Price Lamberto is a 32 year old female who is seen  as self referral presenting with right foot/knee pain    Injury: Pt slipped on ice into the splits. Striking her dorsum right foot and twisting knee.     Location of Pain: right dorsal midfoot, anterior knee  Duration of Pain: 1 day(s)  Rating of Pain: 5/10  Pain is better with: ice  Pain is worse with: walking  Additional Features: swelling  Treatment so far consists of: Nothing  Prior History of related problems: none    Ht 1.753 m (5' 9\")   Wt 99.8 kg (220 lb)   BMI 32.49 kg/m         PMH:  Past Medical History:   Diagnosis Date     Cholelithiasis and acute cholecystitis with obstruction 2011     Cholelithiasis without obstruction 11/23/2011     Personal history of asthma     her asthma went away after she became active in high school sports.        Active problem list:  Patient Active Problem List   Diagnosis     CARDIOVASCULAR SCREENING; LDL GOAL LESS THAN 160     Acne     Menorrhagia     Primary anovulatory infertility     Female infertility     Pregnancy with history of infertility     Pregnancy with history of infertility, second trimester     Dizygotic twins     Elevated blood pressure affecting pregnancy in third trimester, antepartum     Excess skin of abdominal wall       FH:  Family History   Problem Relation Age of Onset     Hypertension Mother      Thyroid Disease Maternal Grandmother      Colon Cancer No family hx of      Breast Cancer No family hx of        SH:  Social History     Socioeconomic History     Marital status:      Spouse name: Shaquille     Number of children: 1     Years of education: 14     Highest education level: Not on file   Occupational History     Occupation: [employer]     Employer: [employer]      Comment: [employer]   Tobacco Use     Smoking status: Never Smoker     Smokeless tobacco: Never Used   Substance and Sexual Activity     Alcohol use: Yes     Comment: " socially -not in PG     Drug use: No     Sexual activity: Yes     Partners: Male   Other Topics Concern     Parent/sibling w/ CABG, MI or angioplasty before 65F 55M? No   Social History Narrative     Not on file     Social Determinants of Health     Financial Resource Strain: Not on file   Food Insecurity: Not on file   Transportation Needs: Not on file   Physical Activity: Not on file   Stress: Not on file   Social Connections: Not on file   Intimate Partner Violence: Not on file   Housing Stability: Not on file       MEDS:  See EMR, reviewed  ALL:  See EMR, reviewed    REVIEW OF SYSTEMS:  CONSTITUTIONAL:NEGATIVE for fever, chills, change in weight  INTEGUMENTARY/SKIN: NEGATIVE for worrisome rashes, moles or lesions  EYES: NEGATIVE for vision changes or irritation  ENT/MOUTH: NEGATIVE for ear, mouth and throat problems  RESP:NEGATIVE for significant cough or SOB  BREAST: NEGATIVE for masses, tenderness or discharge  CV: NEGATIVE for chest pain, palpitations or peripheral edema  GI: NEGATIVE for nausea, abdominal pain, heartburn, or change in bowel habits  :NEGATIVE for frequency, dysuria, or hematuria  :NEGATIVE for frequency, dysuria, or hematuria  NEURO: NEGATIVE for weakness, dizziness or paresthesias  ENDOCRINE: NEGATIVE for temperature intolerance, skin/hair changes  HEME/ALLERGY/IMMUNE: NEGATIVE for bleeding problems  PSYCHIATRIC: NEGATIVE for changes in mood or affect      Objective: Normal range of motion at the right hip to internal rotation external rotation abduction.  Normal strength about the right hip to adduction and abduction against resistance.  Can do an active straight leg raise with no extension lag.  No effusion at the right knee.  I can flex and extend the right knee fully.  Mildly tender along the course of the MCL.  Gentle stressing of the MCL is associated with a firm endpoint but some discomfort.  Nontender about the LCL.  LCL stress testing is negative.  Nontender over the medial or  lateral joint line.  Patellar apprehension signs are negative.  Anterior posterior drawer is negative.  Lachman's appears to have a firm endpoint.  Nontender over the proximal fibula.  Tenderness over the bony distal fibula.  Tender at the anterior talofibular ligament, calcaneofibular ligaments, nontender over the posterior talofibular ligament.  Nontender over the syndesmosis.  Nontender over the deltoid ligament.  Nontender over the bony distal tibia.  No defects palpated along the Achilles tendon.  She has an abrasion over the dorsal midfoot.  Skin is otherwise intact.  No signs of cellulitis.  Mild swelling over the dorsal midfoot.  Mild tenderness over the dorsal navicular.  Nontender over the proximal fifth metatarsal or fourth metatarsal.  Nontender in the area of Lisfranc.  Sensation is intact distally.  Appropriate conversation affect.      We went over x-rays of the knee, ankle and foot that show no acute fracture.    Assessment right-sided knee MCL sprain.  Right-sided ankle lateral ankle sprain.  Abrasion dorsal midfoot.    Plan: Walking boot offered for comfort, declined.  Lace up ankle brace offered.  Early range of motion exercises to the ankle.  Look for improvements of the knee symptoms over the next 3 weeks.  Treatment to the abrasion with soapy water explained.  She will watch for signs of secondary infection.  She will look for improvements with her symptoms over the next 3 weeks and follow-up this is not the case.

## 2022-05-14 ENCOUNTER — HEALTH MAINTENANCE LETTER (OUTPATIENT)
Age: 32
End: 2022-05-14

## 2022-07-11 ENCOUNTER — TRANSFERRED RECORDS (OUTPATIENT)
Dept: HEALTH INFORMATION MANAGEMENT | Facility: CLINIC | Age: 32
End: 2022-07-11

## 2022-07-11 LAB
ALT SERPL-CCNC: 25 IU/L (ref 0–32)
AST SERPL-CCNC: 21 IU/L (ref 0–40)
CHOLESTEROL (EXTERNAL): 178 MG/DL (ref 100–199)
CREATININE (EXTERNAL): 0.77 MG/DL (ref 0.57–1)
GFR ESTIMATED (EXTERNAL): 104 ML/MIN/1.73
GLUCOSE (EXTERNAL): 96 MG/DL (ref 65–99)
HDLC SERPL-MCNC: 58 MG/DL
LDL CHOLESTEROL CALCULATED (EXTERNAL): 107 MG/DL (ref 0–99)
POTASSIUM (EXTERNAL): 4.4 MMOL/L (ref 3.5–5.2)
TRIGLYCERIDES (EXTERNAL): 66 MG/DL (ref 0–149)

## 2022-07-15 ENCOUNTER — OFFICE VISIT (OUTPATIENT)
Dept: URGENT CARE | Facility: URGENT CARE | Age: 32
End: 2022-07-15
Payer: COMMERCIAL

## 2022-07-15 VITALS
TEMPERATURE: 98.7 F | BODY MASS INDEX: 38.4 KG/M2 | HEART RATE: 94 BPM | OXYGEN SATURATION: 100 % | WEIGHT: 260.06 LBS | RESPIRATION RATE: 12 BRPM | DIASTOLIC BLOOD PRESSURE: 81 MMHG | SYSTOLIC BLOOD PRESSURE: 129 MMHG

## 2022-07-15 DIAGNOSIS — K59.01 SLOW TRANSIT CONSTIPATION: ICD-10-CM

## 2022-07-15 DIAGNOSIS — R10.84 ABDOMINAL PAIN, GENERALIZED: ICD-10-CM

## 2022-07-15 DIAGNOSIS — R10.2 PELVIC PAIN IN FEMALE: Primary | ICD-10-CM

## 2022-07-15 DIAGNOSIS — R10.2 PELVIC PRESSURE IN FEMALE: ICD-10-CM

## 2022-07-15 DIAGNOSIS — R14.1 ABDOMINAL GAS PAIN: ICD-10-CM

## 2022-07-15 LAB
ALBUMIN UR-MCNC: NEGATIVE MG/DL
APPEARANCE UR: ABNORMAL
BACTERIA #/AREA URNS HPF: ABNORMAL /HPF
BILIRUB UR QL STRIP: NEGATIVE
COLOR UR AUTO: YELLOW
GLUCOSE UR STRIP-MCNC: NEGATIVE MG/DL
HCG UR QL: NEGATIVE
HGB UR QL STRIP: NEGATIVE
KETONES UR STRIP-MCNC: NEGATIVE MG/DL
LEUKOCYTE ESTERASE UR QL STRIP: NEGATIVE
NITRATE UR QL: NEGATIVE
PH UR STRIP: 7 [PH] (ref 5–7)
RBC #/AREA URNS AUTO: ABNORMAL /HPF
SP GR UR STRIP: 1.02 (ref 1–1.03)
SQUAMOUS #/AREA URNS AUTO: ABNORMAL /LPF
UROBILINOGEN UR STRIP-ACNC: 1 E.U./DL
WBC #/AREA URNS AUTO: ABNORMAL /HPF

## 2022-07-15 PROCEDURE — 99214 OFFICE O/P EST MOD 30 MIN: CPT | Performed by: NURSE PRACTITIONER

## 2022-07-15 PROCEDURE — 81025 URINE PREGNANCY TEST: CPT | Performed by: NURSE PRACTITIONER

## 2022-07-15 PROCEDURE — 81001 URINALYSIS AUTO W/SCOPE: CPT | Performed by: NURSE PRACTITIONER

## 2022-07-15 RX ORDER — VITAMIN A ACETATE, .BETA.-CAROTENE, ASCORBIC ACID, CHOLECALCIFEROL, .ALPHA.-TOCOPHEROL ACETATE, DL-, THIAMINE MONONITRATE, RIBOFLAVIN, NIACINAMIDE, PYRIDOXINE HYDROCHLORIDE, FOLIC ACID, CYANOCOBALAMIN, CALCIUM CARBONATE, FERROUS FUMARATE, ZINC OXIDE, AND CUPRIC OXIDE 2000; 2000; 120; 400; 22; 1.84; 3; 20; 10; 1; 12; 200; 27; 25; 2 [IU]/1; [IU]/1; MG/1; [IU]/1; MG/1; MG/1; MG/1; MG/1; MG/1; MG/1; UG/1; MG/1; MG/1; MG/1; MG/1
1 TABLET ORAL DAILY
COMMUNITY
End: 2023-03-28

## 2022-07-15 RX ORDER — FERROUS SULFATE 325(65) MG
325 TABLET ORAL
COMMUNITY
End: 2023-03-28

## 2022-07-15 ASSESSMENT — ENCOUNTER SYMPTOMS
DYSURIA: 0
FREQUENCY: 0
VOMITING: 0
ABDOMINAL PAIN: 1
EYES NEGATIVE: 1
RESPIRATORY NEGATIVE: 1
HEARTBURN: 0
CONSTITUTIONAL NEGATIVE: 1
HEMATURIA: 0
FLANK PAIN: 0
NAUSEA: 0
CARDIOVASCULAR NEGATIVE: 1

## 2022-07-16 NOTE — PATIENT INSTRUCTIONS
"Drink more water  Start \"Smooth Move\" tea tonight - 1 teabag  Start MiraLax 1 capful in the morning  Gas-X for gas pains  "

## 2022-07-16 NOTE — PROGRESS NOTES
Assessment & Plan     ICD-10-CM    1. Pelvic pain in female  R10.2 Acute - unstable. HPI as noted. UA WNL.    2. Abdominal pain, generalized  R10.84 Abd Xray concerning for constipation with gas   3. Pelvic pressure in female  R10.2 Buildup. Discussed increased intake of water   4. Slow transit constipation  K59.01 Along with use of Senna tea and MiraLax.    5. Abdominal gas pain  R14.1 Education provided.        I spent a total of 30 minutes on the day of the visit.   Time spent doing chart review, history and exam, documentation and further activities per the note        Return if symptoms worsen or fail to improve.    Francisca Dominguez, LISA CNP  [facility] is a 32 year old female who presents to clinic today for the following health issues:  Chief Complaint   Patient presents with     Pelvic Pain     Pelvic pain for past 3 days, worse when she lays down, she is fine standing but when sitting and lying down pressure and shooting pain in the pelvic area, no vaginal or urinary sx per patient report. Nausea when lying down, no vomiting. No fever/chills. No constipation and no hx of constipation issues.     Syed Orantes is a 32 year old female with c/o r-sided pelvic pain/fullness of varying degrees over the past week. She is concerned it may be related to her IUD. Denies CP, palpitations, fatigue, nausea, vomiting, increased SOB/PARIS, fever, chills, and/or b/b concerns today.    Abdominal Pain    Location: RLQ   Radiation: None.    Pain character: pressure,   Severity: 3 on a scale of 1-10.    Duration: 1 week(s)   Course of Illness: fluctuating.  Exacerbated by: nothing  Relieved by: nothing.  Associated Symptoms: none.  Female : Not applicable  Surgical History: bariatric surgeries    Review of Systems   Constitutional: Negative.    HENT: Negative.    Eyes: Negative.    Respiratory: Negative.    Cardiovascular: Negative.    Gastrointestinal:  Positive for abdominal pain. Negative for heartburn, nausea and vomiting.   Genitourinary: Positive for pelvic pain. Negative for dysuria, flank pain, frequency, hematuria, urgency, vaginal bleeding, vaginal discharge and vaginal pain.           Objective    /81 (BP Location: Left arm, Patient Position: Sitting, Cuff Size: Adult Large)   Pulse 94   Temp 98.7  F (37.1  C) (Tympanic)   Resp 12   Wt 118 kg (260 lb 1 oz)   SpO2 100%   Breastfeeding No   BMI 38.40 kg/m    GENERAL: healthy, alert and no distress  EYES: Eyes grossly normal to inspection, PERRL and conjunctivae and sclerae normal  HENT: ear canals and TM's normal, nose and mouth without ulcers or lesions  NECK: no adenopathy, no asymmetry, masses, or scars and thyroid normal to palpation  RESP: lungs clear to auscultation - no rales, rhonchi or wheezes  BREAST: normal without masses, tenderness or nipple discharge and no palpable axillary masses or adenopathy  CV: regular rate and rhythm, normal S1 S2, no S3 or S4, no murmur, click or rub, no peripheral edema and peripheral pulses strong  ABDOMEN: tenderness RUQ and RLQ, bowel sounds normal, no palpable or pulsatile masses, no bruits heard, no palpable renal abnormalities  and soft to palpation  MS: no gross musculoskeletal defects noted, no edema  SKIN: no suspicious lesions or rashes  NEURO: Normal strength and tone, mentation intact and speech normal  PSYCH: mentation appears normal, affect normal/bright     Latest Reference Range & Units 07/15/22 18:56   HCG Qual Urine Negative  Negative      Latest Reference Range & Units 07/15/22 18:53   Color Urine Colorless, Straw, Light Yellow, Yellow  Yellow   Appearance Urine Clear  Slightly Cloudy !   Glucose Urine Negative mg/dL Negative   Bilirubin Urine Negative  Negative   Ketones Urine Negative mg/dL Negative   Specific Gravity Urine 1.003 - 1.035  1.020   pH Urine 5.0 - 7.0  7.0   Protein Albumin Urine Negative mg/dL Negative   Urobilinogen  Urine 0.2, 1.0 E.U./dL 1.0   Nitrite Urine Negative  Negative   Blood Urine Negative  Negative   Leukocyte Esterase Urine Negative  Negative   WBC Urine 0-5 /HPF /HPF 0-5   RBC Urine 0-2 /HPF /HPF 0-2   Bacteria Urine None Seen /HPF Few !   Squamous Epithelial /LPF Urine None Seen /LPF Moderate !       Narrative & Impression   EXAM: XR ABDOMEN 2VIEWS  LOCATION: [facility]  DATE/TIME: 7/15/2022 6:44 PM     INDICATION:  Pelvic pain in female, Abdominal pain, generalized, Pelvic pressure in female  COMPARISON: None.                                                                      IMPRESSION: Right hemidiaphragm is excluded from field-of-view on the upright projection, precluding complete assessment for intraperitoneal free air.     Within this limitation, there is no appreciable intraperitoneal free air. Bowel gas pattern is nonspecific, due to a paucity of small bowel gas. There is mild to moderate colonic stool burden.     Intrauterine device is present. Postsurgical changes of cholecystectomy. Multiple phleboliths overlie the pelvis.        Reading Providers     Reading Role Read Date   Faraz Moreno MD  7/15/2022     Signed by    Signed Date/Time  Phone Pager   FARAZ MORENO 7/15/2022 19:50 [phone]

## 2022-09-03 ENCOUNTER — HEALTH MAINTENANCE LETTER (OUTPATIENT)
Age: 32
End: 2022-09-03

## 2023-03-08 NOTE — NURSING NOTE
"Chief Complaint   Patient presents with     Post Partum Exam       Initial BP (!) 153/98 (BP Location: Right arm, Cuff Size: Adult Regular)  Pulse 79  Wt 250 lb (113.4 kg)  LMP 03/09/2017 (Exact Date)  SpO2 97%  Breastfeeding? Yes  BMI 37.46 kg/m2 Estimated body mass index is 37.46 kg/(m^2) as calculated from the following:    Height as of 4/18/17: 5' 8.5\" (1.74 m).    Weight as of this encounter: 250 lb (113.4 kg).  Medication Reconciliation: complete   JORDAN Gilliland 12/22/2017         " Initial (On Arrival)

## 2023-03-11 ENCOUNTER — TRANSFERRED RECORDS (OUTPATIENT)
Dept: HEALTH INFORMATION MANAGEMENT | Facility: CLINIC | Age: 33
End: 2023-03-11
Payer: COMMERCIAL

## 2023-03-11 LAB
ALBUMIN (URINE) MG/SPEC: 0.6 MG/DL
ALT SERPL-CCNC: 23 U/L (ref 6–29)
AST SERPL-CCNC: 17 U/L (ref 10–30)
CREATININE (EXTERNAL): 0.76 MG/DL (ref 0.5–0.97)
GFR ESTIMATED (EXTERNAL): 105 ML/MIN/1.73M2
GLUCOSE (EXTERNAL): 92 MG/DL (ref 65–99)
HBA1C MFR BLD: 5.9 % (ref 0–5.7)
POTASSIUM (EXTERNAL): 4.5 MMOL/L (ref 3.5–5.3)

## 2023-03-27 ASSESSMENT — ENCOUNTER SYMPTOMS
NERVOUS/ANXIOUS: 0
FEVER: 0
JOINT SWELLING: 0
ABDOMINAL PAIN: 0
HEADACHES: 0
HEMATURIA: 0
BREAST MASS: 0
ARTHRALGIAS: 1
EYE PAIN: 0
FREQUENCY: 1
PALPITATIONS: 0
DYSURIA: 0
MYALGIAS: 0
HEARTBURN: 0
CHILLS: 0
COUGH: 0
PARESTHESIAS: 0
NAUSEA: 0
CONSTIPATION: 0
HEMATOCHEZIA: 0
DIARRHEA: 1
WEAKNESS: 0
DIZZINESS: 0
SORE THROAT: 0

## 2023-03-28 ENCOUNTER — OFFICE VISIT (OUTPATIENT)
Dept: FAMILY MEDICINE | Facility: CLINIC | Age: 33
End: 2023-03-28
Payer: COMMERCIAL

## 2023-03-28 ENCOUNTER — MYC MEDICAL ADVICE (OUTPATIENT)
Dept: FAMILY MEDICINE | Facility: CLINIC | Age: 33
End: 2023-03-28

## 2023-03-28 VITALS
HEART RATE: 86 BPM | DIASTOLIC BLOOD PRESSURE: 85 MMHG | TEMPERATURE: 99.1 F | HEIGHT: 69 IN | WEIGHT: 242 LBS | SYSTOLIC BLOOD PRESSURE: 135 MMHG | OXYGEN SATURATION: 100 % | BODY MASS INDEX: 35.84 KG/M2

## 2023-03-28 DIAGNOSIS — R73.03 PREDIABETES: ICD-10-CM

## 2023-03-28 DIAGNOSIS — Z00.00 ROUTINE GENERAL MEDICAL EXAMINATION AT A HEALTH CARE FACILITY: Primary | ICD-10-CM

## 2023-03-28 DIAGNOSIS — Z12.4 CERVICAL CANCER SCREENING: ICD-10-CM

## 2023-03-28 PROCEDURE — 99395 PREV VISIT EST AGE 18-39: CPT | Mod: 25 | Performed by: FAMILY MEDICINE

## 2023-03-28 PROCEDURE — 90471 IMMUNIZATION ADMIN: CPT | Performed by: FAMILY MEDICINE

## 2023-03-28 PROCEDURE — 90715 TDAP VACCINE 7 YRS/> IM: CPT | Performed by: FAMILY MEDICINE

## 2023-03-28 RX ORDER — TIRZEPATIDE 5 MG/.5ML
5 INJECTION, SOLUTION SUBCUTANEOUS
Qty: 2 ML | Refills: 0 | Status: SHIPPED | OUTPATIENT
Start: 2023-04-28 | End: 2023-03-29

## 2023-03-28 RX ORDER — TIRZEPATIDE 2.5 MG/.5ML
2.5 INJECTION, SOLUTION SUBCUTANEOUS
Qty: 2 ML | Refills: 0 | Status: SHIPPED | OUTPATIENT
Start: 2023-03-28 | End: 2023-03-29

## 2023-03-28 ASSESSMENT — ENCOUNTER SYMPTOMS
HEMATURIA: 0
COUGH: 0
SORE THROAT: 0
FREQUENCY: 1
DIZZINESS: 0
NERVOUS/ANXIOUS: 0
WEAKNESS: 0
HEADACHES: 0
MYALGIAS: 0
ARTHRALGIAS: 1
DIARRHEA: 1
CHILLS: 0
NAUSEA: 0
PARESTHESIAS: 0
PALPITATIONS: 0
CONSTIPATION: 0
FEVER: 0
HEARTBURN: 0
JOINT SWELLING: 0
HEMATOCHEZIA: 0
EYE PAIN: 0
DYSURIA: 0
ABDOMINAL PAIN: 0
BREAST MASS: 0

## 2023-03-28 NOTE — PROGRESS NOTES
SUBJECTIVE:   CC: Syed is an 33 year old who presents for preventive health visit.   Additional Questions 3/28/2023   Patient has been advised of split billing requirements and indicates understanding: Yes     Healthy Habits:     Getting at least 3 servings of Calcium per day:  Yes    Bi-annual eye exam:  NO    Dental care twice a year:  Yes    Sleep apnea or symptoms of sleep apnea:  Daytime drowsiness    Diet:  Carbohydrate counting    Frequency of exercise:  2-3 days/week    Duration of exercise:  15-30 minutes    Taking medications regularly:  Yes    Medication side effects:  Other    PHQ-2 Total Score: 0    Additional concerns today:  Yes    Syed is a 32 yo F with hx asthma, cholelithisis, acne, 2 infertility, pelvic pain    care gaps: hepc, pap, covid#3, tdap, ldl, cmp, cbc    Delivered 5 yr ago to twin.    PROBLEMS TO ADD ON...    Weight:   Hard to loss weight   Wt Readings from Last 4 Encounters:   03/28/23 109.8 kg (242 lb)   07/15/22 118 kg (260 lb 1 oz)   03/31/22 99.8 kg (220 lb)   10/08/21 116 kg (255 lb 11.7 oz)     180-220    Labs;   A1c: 5.9  Today's PHQ-2 Score:   PHQ-2 ( 1999 Pfizer) 3/27/2023   Q1: Little interest or pleasure in doing things 0   Q2: Feeling down, depressed or hopeless 0   PHQ-2 Score 0   Q1: Little interest or pleasure in doing things Not at all   Q2: Feeling down, depressed or hopeless Not at all   PHQ-2 Score 0   LMP: IUD    Have you ever done Advance Care Planning? (For example, a Health Directive, POLST, or a discussion with a medical provider or your loved ones about your wishes): No, advance care planning information given to patient to review.  Patient plans to discuss their wishes with loved ones or provider.      Social History     Tobacco Use     Smoking status: Never     Smokeless tobacco: Never   Substance Use Topics     Alcohol use: Yes     Comment: socially -not in PG       Alcohol Use 3/27/2023   Prescreen: >3 drinks/day or >7 drinks/week? No     Reviewed  orders with patient.  Reviewed health maintenance and updated orders accordingly - Yes  Patient Active Problem List   Diagnosis     CARDIOVASCULAR SCREENING; LDL GOAL LESS THAN 160     Acne     Menorrhagia     Primary anovulatory infertility     Female infertility     Pregnancy with history of infertility     Pregnancy with history of infertility, second trimester     Dizygotic twins     Elevated blood pressure affecting pregnancy in third trimester, antepartum     Excess skin of abdominal wall     Past Surgical History:   Procedure Laterality Date      SECTION       CHOLECYSTECTOMY, LAPOROSCOPIC  2011     COSMETIC SURGERY      breast reduction      HC INSERT IMPLANTABLE CONTRACEPTIVE CAPSULE       HC INSERTION INTRAUTERINE DEVICE       HC REMOVAL IMPLATABLE CONTRACEPTIVE CAPSULE       HC REMOVE INTRAUTERINE DEVICE         Social History     Tobacco Use     Smoking status: Never     Smokeless tobacco: Never   Substance Use Topics     Alcohol use: Yes     Comment: socially -not in PG     Family History   Problem Relation Age of Onset     Hypertension Mother      Hypertension Father      Thyroid Disease Maternal Grandmother      Hypertension Maternal Grandmother      Colon Cancer Maternal Grandmother         Great grandmother     Breast Cancer No family hx of            Breast Cancer Screening:    FHS-7:   Breast CA Risk Assessment (FHS-7) 3/27/2023   Did any of your first-degree relatives have breast or ovarian cancer? No   Did any of your relatives have bilateral breast cancer? No   Did any man in your family have breast cancer? No   Did any woman in your family have breast and ovarian cancer? No   Did any woman in your family have breast cancer before age 50 y? No   Do you have 2 or more relatives with breast and/or ovarian cancer? No   Do you have 2 or more relatives with breast and/or bowel cancer? No     click delete button to remove this line now  Patient under 40 years of age: Routine Mammogram  "Screening not recommended.   Pertinent mammograms are reviewed under the imaging tab.    History of abnormal Pap smear: NO - age 30-65 PAP every 5 years with negative HPV co-testing recommended  PAP / HPV 5/4/2016 1/21/2013   PAP (Historical) NIL NIL     Reviewed and updated as needed this visit by clinical staff   Tobacco  Allergies  Meds              Reviewed and updated as needed this visit by Provider                 Review of Systems   Constitutional: Negative for chills and fever.   HENT: Negative for congestion, ear pain, hearing loss and sore throat.    Eyes: Negative for pain and visual disturbance.   Respiratory: Negative for cough.    Cardiovascular: Negative for chest pain, palpitations and peripheral edema.   Gastrointestinal: Positive for diarrhea. Negative for abdominal pain, constipation, heartburn, hematochezia and nausea.   Breasts:  Negative for tenderness, breast mass and discharge.   Genitourinary: Positive for frequency. Negative for dysuria, genital sores, hematuria, pelvic pain, urgency, vaginal bleeding and vaginal discharge.   Musculoskeletal: Positive for arthralgias. Negative for joint swelling and myalgias.   Skin: Negative for rash.   Neurological: Negative for dizziness, weakness, headaches and paresthesias.   Psychiatric/Behavioral: Positive for mood changes. The patient is not nervous/anxious.      OBJECTIVE:   /85 (BP Location: Left arm, Patient Position: Sitting, Cuff Size: Adult Regular)   Pulse 86   Temp 99.1  F (37.3  C) (Tympanic)   Ht 1.753 m (5' 9\")   Wt 109.8 kg (242 lb)   SpO2 100%   BMI 35.74 kg/m    Physical Exam  GENERAL: healthy, alert and no distress  EYES: Eyes grossly normal to inspection, PERRL and conjunctivae and sclerae normal  HENT: ear canals and TM's normal, nose and mouth without ulcers or lesions  NECK: no adenopathy and thyroid normal to palpation  RESP: lungs clear to auscultation - no rales, rhonchi or wheezes  BREAST: deferred  CV: regular " "rate and rhythm, normal S1 S2, no S3 or S4, no murmur, click or rub, no peripheral edema   ABDOMEN: soft, nontender, no hepatosplenomegaly, no masses and bowel sounds normal  MS: no gross musculoskeletal defects noted, no edema  SKIN: no suspicious lesions or rashes  NEURO: Normal strength and tone, mentation intact and speech normal  PSYCH: mentation appears normal, affect normal/bright    Diagnostic Test Results:  Labs reviewed in Epic    ASSESSMENT/PLAN:   Syed was seen today for physical.    Diagnoses and all orders for this visit:    Routine general medical examination at a health care facility        Reviewed labs form outside which show prediabetes otherwise normal; discussed lifestyle changes and will start tirzapatide. Will forward labs to put in chart. Will follow up with OB for PAP/HPV.    Prediabetes  -     tirzepatide (MOUNJARO) 2.5 MG/0.5ML pen; Inject 2.5 mg Subcutaneous every 7 days for 28 days  -     tirzepatide (MOUNJARO) 5 MG/0.5ML pen; Inject 5 mg Subcutaneous every 7 days for 28 days    BMI 35.0-35.9,adult  -     tirzepatide (MOUNJARO) 2.5 MG/0.5ML pen; Inject 2.5 mg Subcutaneous every 7 days for 28 days  -     tirzepatide (MOUNJARO) 5 MG/0.5ML pen; Inject 5 mg Subcutaneous every 7 days for 28 days    Cervical cancer screening    Other orders  -     TDAP VACCINE (Adacel, Boostrix)    Patient has been advised of split billing requirements and indicates understanding: Yes      COUNSELING:  Reviewed preventive health counseling, as reflected in patient instructions       Regular exercise       Healthy diet/nutrition       Immunizations    Vaccinated for: TDAP      BMI:   Estimated body mass index is 35.74 kg/m  as calculated from the following:    Height as of this encounter: 1.753 m (5' 9\").    Weight as of this encounter: 109.8 kg (242 lb).   Weight management plan: Patient referred to endocrine and/or weight management specialty Has upcoming appointment discussed starting a GLP-1 agonist at " this time and long with lifestyle modification.      She reports that she has never smoked. She has never used smokeless tobacco.      {Counseling Resources  [place] Preventive Services Task Force  Cholesterol Screening  Health diet/nutrition  Pooled Cohorts Equation Calculator  USDA's MyPlate  ASA Prophylaxis  Lung CA Screening  Osteoporosis prevention/bone health   {Breast Cancer Risk Calculator  BRCA-Related Cancer Risk Assessment FHS-7 Tool   Donavon Mehta MD  [facility]

## 2023-03-29 RX ORDER — TIRZEPATIDE 5 MG/.5ML
5 INJECTION, SOLUTION SUBCUTANEOUS
Qty: 2 ML | Refills: 0 | Status: SHIPPED | OUTPATIENT
Start: 2023-04-29 | End: 2023-03-31

## 2023-03-29 RX ORDER — TIRZEPATIDE 2.5 MG/.5ML
2.5 INJECTION, SOLUTION SUBCUTANEOUS
Qty: 2 ML | Refills: 0 | Status: SHIPPED | OUTPATIENT
Start: 2023-03-29 | End: 2023-03-31

## 2023-03-31 ENCOUNTER — TELEPHONE (OUTPATIENT)
Dept: FAMILY MEDICINE | Facility: CLINIC | Age: 33
End: 2023-03-31
Payer: COMMERCIAL

## 2023-03-31 DIAGNOSIS — R73.03 PREDIABETES: ICD-10-CM

## 2023-03-31 DIAGNOSIS — E66.9 OBESITY WITHOUT SERIOUS COMORBIDITY, UNSPECIFIED CLASSIFICATION, UNSPECIFIED OBESITY TYPE: Primary | ICD-10-CM

## 2023-03-31 NOTE — TELEPHONE ENCOUNTER
Prior Authorization Retail Medication Request    Medication/Dose: MOUNJARO 2.5 MG/0.5ML pen  ICD code (if different than what is on RX):  Z68.35 and R73.03  Previously Tried and Failed:    Rationale:      Insurance Name:  [employer]  Insurance ID:  [id number]      Pharmacy Information (if different than what is on RX)  Name:  [facility]   Phone:  [phone]

## 2023-04-03 ENCOUNTER — MYC MEDICAL ADVICE (OUTPATIENT)
Dept: FAMILY MEDICINE | Facility: CLINIC | Age: 33
End: 2023-04-03
Payer: COMMERCIAL

## 2023-04-03 NOTE — TELEPHONE ENCOUNTER
PRIOR AUTHORIZATION DENIED    Medication: MOUNJARO 2.5 MG/0.5ML pen    Denial Date: 4/3/2023    Denial Rationale: Medication is only covered for diagnosis of type 2 diabetes. Patient also has to first try/fail metformin, a sulonylurea, or pioglitazone.        Appeal Information: If provider would like to appeal this decision please attach a detailed letter of medical necessity to the patient's chart and route the encounter back to the PA Team.

## 2023-04-03 NOTE — TELEPHONE ENCOUNTER
PA Initiation    Medication: MOUNJARO 2.5 MG/0.5ML pen  Insurance Company: [employer] - Phone [phone] Fax [phone]  Pharmacy Filling the Rx: [id number] - [place], [place] - [address] AT [address] OF [facility]  Filling Pharmacy Phone: [phone]  Filling Pharmacy Fax: [phone]  Start Date: 4/3/2023

## 2023-04-05 NOTE — TELEPHONE ENCOUNTER
Updated the patient with the information provided in the Prior Authorization encounter 3/31/2023.    Jimena Belcher,

## 2023-04-05 NOTE — TELEPHONE ENCOUNTER
Please inform patient that PA is only approved for patients with diabetes. Please inform her her PCP is out and will address next steps when he returns.   Meredith Grant PA-C

## 2023-04-06 RX ORDER — TIRZEPATIDE 2.5 MG/.5ML
2.5 INJECTION, SOLUTION SUBCUTANEOUS
Refills: 0 | OUTPATIENT
Start: 2023-04-06 | End: 2023-05-04

## 2023-04-06 RX ORDER — TIRZEPATIDE 5 MG/.5ML
5 INJECTION, SOLUTION SUBCUTANEOUS
Refills: 0 | OUTPATIENT
Start: 2023-04-06

## 2023-04-07 NOTE — TELEPHONE ENCOUNTER
See telephone encounter 03/31/2023. PA was done and was denied, this response was sent to PCP 04/03/2023.    Rukhsana Salguero  to Aurea Canales    4/7/23  9:05 AM  Duplicate request - please see telephone encounter 3/31/23.   PA had been denied. If provider would like to appeal please notify on original encounter.   Thank you,   Rukhsana     Also note that pt was already informed of this below by TC per Rudy's message.     Mariam Grewal RN   [facility]

## 2023-04-07 NOTE — TELEPHONE ENCOUNTER
See telephone encounter 03/31/2023. PA was done and was denied, this response was sent to PCP 04/03/2023.     Rukhsana Salguero  to Aurea Canales    4/7/23  9:05 AM  Duplicate request - please see telephone encounter 3/31/23.   PA had been denied. If provider would like to appeal please notify on original encounter.   Thank you,   Rukhsana      Also note that pt was already informed of this below by TC per Rudy's message (MyChart encounter 04/03/2023).      Mariam Grewal RN   [facility]

## 2023-04-19 ENCOUNTER — TELEPHONE (OUTPATIENT)
Dept: FAMILY MEDICINE | Facility: CLINIC | Age: 33
End: 2023-04-19
Payer: COMMERCIAL

## 2023-04-19 NOTE — TELEPHONE ENCOUNTER
Prior Authorization Retail Medication Request    Medication/Dose: Semaglutide-Weight Management (WEGOVY) 0.25 MG/0.5ML pen  ICD code (if different than what is on RX):    Previously Tried and Failed:    Rationale:      Insurance Name:  [employer]  Insurance ID:  [id number]      Pharmacy Information (if different than what is on RX)  Name:  [facility]  Phone:  [phone]

## 2023-04-19 NOTE — TELEPHONE ENCOUNTER
Prior Authorization Retail Medication Request     Medication/Dose: Semaglutide-Weight Management (WEGOVY) 0.25 MG/0.5ML pen  ICD code (if different than what is on RX):    Previously Tried and Failed:    Rationale:       Insurance Name:  [employer]  Insurance ID:  [id number]        Pharmacy Information (if different than what is on RX)  Name:  [facility]  Phone:  [phone]

## 2023-04-19 NOTE — TELEPHONE ENCOUNTER
Will try Semaglutide; not sure whether will be covered. Prescription sent to the pharmacy    SUBQ: Initiate and adjust dose using the following schedule: In patients who do not tolerate a dosage increase, consider delaying the increase for an additional 4 weeks:  Week 1 through week 4 : 0.25 mg once weekly.  Week 5 through week 8: 0.5 mg once weekly.  Week 9 through week 12: 1 mg once weekly.  Week 13 through week 16: 1.7 mg once weekly.  Week 17 and thereafter (maintenance dosage): 2.4 mg once weekly; if not tolerated, may temporarily decrease dosage to 1.7 mg once weekly for up to 4 additional weeks, then increase to 2.4 mg once weekly.

## 2023-04-19 NOTE — TELEPHONE ENCOUNTER
Called patient no answer left message to return call to clinic.     IF PATIENT RETURNS CALL PLEASE read message below and inform patient regarding Dr Snyder message     Thank you  LS

## 2023-04-19 NOTE — TELEPHONE ENCOUNTER
Received call from patient. Relayed provider's message as written. Patient verbalized understanding, requesting instructions to be sent via [employer]. Patient confirmed she spoke to her pharmacy, who informed her that a prior authorization is required to be submitted for Wegovy as well.     [employer] message sent.     ANKUSH Bartlett RN  [facility]

## 2023-04-24 NOTE — TELEPHONE ENCOUNTER
Prior Authorization Not Needed per Insurance        Medication: Semaglutide-Weight Management (WEGOVY) 0.25 MG/0.5ML pen -PA NO NEEDED   Insurance Company: [employer] - Phone [phone] Fax [phone]  Expected CoPay: $1,300    Pharmacy Filling the Rx: [id number] - [place], [place] - [address] [place], [place] [address] AT CORNER OF [facility]  Pharmacy Notified: Yes  Patient Notified: No    Called pharmacy and pharmacy stated that PA is Not Needed and medication is covered. Pharmacy stated that they have a paid claim on medication quantity 2 ml for 28 day supply through patients insurance; however, co-pay is high. Co-pay high possibility due to patients deductible. Requested pharmacy to contact patient on cost prior to picking up medication. Cash price Quantity 2 ml is $1,600. Insurance also stated that PA is Not Needed and medication is covered. PA Approval on file Until 9/16/2023 and has 5 refills remaining.    oral

## 2023-06-20 ENCOUNTER — VIRTUAL VISIT (OUTPATIENT)
Dept: ENDOCRINOLOGY | Facility: CLINIC | Age: 33
End: 2023-06-20
Payer: COMMERCIAL

## 2023-06-20 VITALS — HEIGHT: 69 IN | BODY MASS INDEX: 35.55 KG/M2 | WEIGHT: 240 LBS

## 2023-06-20 DIAGNOSIS — E66.812 CLASS 2 OBESITY DUE TO EXCESS CALORIES WITHOUT SERIOUS COMORBIDITY WITH BODY MASS INDEX (BMI) OF 35.0 TO 35.9 IN ADULT: Primary | ICD-10-CM

## 2023-06-20 DIAGNOSIS — E66.09 CLASS 2 OBESITY DUE TO EXCESS CALORIES WITHOUT SERIOUS COMORBIDITY WITH BODY MASS INDEX (BMI) OF 35.0 TO 35.9 IN ADULT: Primary | ICD-10-CM

## 2023-06-20 PROCEDURE — 99202 OFFICE O/P NEW SF 15 MIN: CPT | Mod: VID | Performed by: INTERNAL MEDICINE

## 2023-06-20 ASSESSMENT — PAIN SCALES - GENERAL: PAINLEVEL: NO PAIN (0)

## 2023-06-20 NOTE — LETTER
"2023       RE: Syed Orantes  [address]  [facility] [address]     Dear Colleague,    Thank you for referring your patient, Syed Orantes, to the [facility] [place] at [facility]. Please see a copy of my visit note below.    Virtual Visit Details    Type of service:  Video Visit     Originating Location (pt. Location): Home    Distant Location (provider location):  Off-site  Platform used for Video Visit: [facility] Medical Weight Management Consult    PATIENT:  Syed Orantes  MRN:         [id number]  :         1990  MEGAN:         2023    Dear PCP,    I had the pleasure of seeing your patient, Syed Orantes. Full intake/assessment was done to determine barriers to weight loss success and develop a treatment plan. Syed Orantes is a 33 year old female interested in treatment of medical problems associated with excess weight. She has a height of 5' 9\", a weight of 240 lbs 0 oz, and the calculated Body mass index is 35.44 kg/m .    She has the following co-morbidities: prediabetes, PCOS    Concern of prediabetes A1c5.9% in 2023.    Weight gain after having twin daughter and work stress  Difficulty losing weight   Started Wegovy in 2023 -- Lost 20 lbs from 260 lbs to 240 lbs  It helps with less hungry and appetite. She feels that she has more energy.  No side effect with Wegovy.    Exercise --20-30 minutes walking and weight lifting    Sleep -- good 6-7hours per night         2023     1:10 PM   --   I have the following health issues associated with obesity Pre-Diabetes    Polycystic Ovarian Syndrome   I have the following symptoms associated with obesity None of the above            View : No data to display.                    2023     1:10 PM   Referring Provider   Please name the provider who referred you to Medical Weight Management  If you do not " "know, please answer \"I Don't Know\" Self referral           6/19/2023     1:10 PM   Weight History   How concerned are you about your weight? Very Concerned   I became overweight As an Adult   The following factors have contributed to my weight gain Change in Schedule    Eating Wrong Types of Food    Lack of Exercise    Stress   I have tried the following methods to lose weight Slim Fast or Other Liquid Diets    Medications    Fasting   My lowest weight since age 16 was 180   My highest weight since age 16 was 275   The most weight I have ever lost was (lbs) 20   I have the following family history of obesity/being overweight My mother is overweight   How has your weight changed over the last year? Gained   How many pounds? 15 lbs           6/19/2023     1:10 PM   Diet Recall Review with Patient   If you do eat lunch, what types of food do you typically eat? salad, tuna, fruit, yogurt   If you do eat supper, what types of food do you typically eat? I usually cook with protein, veggies and fruit   How many glasses of juice do you drink in a typical day? 0   How many of glasses of milk do you drink in a typical day? 0   How many 8oz glasses of sugar containing drinks such as Talon-Aid/sweet tea do you drink in a day? 1   How many cans/bottles of sugar pop/soda/tea/sports drinks do you drink in a day? 0   How many cans/bottles of diet pop/soda/tea or sports drink do you drink in a day? 1   How often do you have a drink of alcohol? Monthly or Less   If you do drink, how many drinks might you have in a day? 1 or 2           6/19/2023     1:10 PM   Eating Habits   Generally, my meals include foods like these bread, pasta, rice, potatoes, corn, crackers, sweet dessert, pop, or juice A Few Times a Week   Generally, my meals include foods like these fried meats, brats, burgers, french fries, pizza, cheese, chips, or ice cream Once a Week   Eat fast food (like [employer]onal[employer], Burger Gildardo, Taco Bell) Once a Week   Eat at a buffet or " sit-down restaurant Less Than Weekly   Eat most of my meals in front of the TV or computer Less Than Weekly   Often skip meals, eat at random times, have no regular eating times A Few Times a Week   Rarely sit down for a meal but snack or graze throughout Never   Eat extra snacks between meals Never   Eat most of my food at the end of the day Less Than Weekly   Eat in the middle of the night or wake up at night to eat Never   Eat extra snacks to prevent or correct low blood sugar Never   Eat to prevent acid reflux or stomach pain Never   Worry about not having enough food to eat Never   I eat when I am depressed Never   I eat when I am stressed Less Than Weekly   I eat when I am bored Never   I eat when I am anxious Never   I eat when I am happy or as a reward Less Than Weekly   I feel hungry all the time even if I just have eaten Less Than Weekly   Feeling full is important to me Once a Week   I finish all the food on my plate even if I am already full Never   I can't resist eating delicious food or walk past the good food/smell Never   I eat/snack without noticing that I am eating Never   I eat more than usual when I see others eating Never   I have trouble not eating sweets, ice cream, cookies, or chips if they are around the house Less Than Weekly   I think about food all day Never   Please list any other foods you crave? Ice Cream           6/19/2023     1:10 PM   Amount of Food   I feel out of control when eating Monthly   I eat a large amount of food, like a loaf of bread, a box of cookies, a pint/quart of ice cream, all at once Never   I eat a large amount of food even when I am not hungry Never   I eat rapidly Never   I eat alone because I feel embarrassed and do not want others to see how much I have eaten Never   I eat until I am uncomfortably full Never   I feel bad, disgusted, or guilty after I overeat Never           6/19/2023     1:10 PM   Activity/Exercise History   How much of a typical 12 hour day  do you spend sitting? Most of the Day   How much of a typical 12 hour day do you spend lying down? Less Than Half the Day   How much of a typical day do you spend walking/standing? Less Than Half the Day   How many hours (not including work) do you spend on the TV/Video Games/Computer/Tablet/Phone? 2-3 Hours   How many times a week are you active for the purpose of exercise? 2-3 Times a Week   What keeps you from being more active? Lack of Time    Unsure What To Do   How many total minutes do you spend doing some activity for the purpose of exercising when you exercise? 15-30 Minutes       PAST MEDICAL HISTORY:  Past Medical History:   Diagnosis Date     Cholelithiasis and acute cholecystitis with obstruction 2011     Cholelithiasis without obstruction 11/23/2011     Personal history of asthma     her asthma went away after she became active in high school sports.            6/19/2023     1:10 PM   Work/Social History Reviewed With Patient   My employment status is Full-Time   My job is Clinical informatics   How much of your job is spent on the computer or phone? 100%   How many hours do you spend commuting to work daily? 0   What is your marital status? /In a Relationship   If in a relationship, is your significant other overweight? No   If you have children, are they overweight? No   Who does the food shopping? Me           6/19/2023     1:10 PM   Mental Health History Reviewed With Patient   Have you ever been physically or sexually abused? No   How often in the past 2 weeks have you felt little interest or pleasure in doing things? Not at all   Over the past 2 weeks how often have you felt down, depressed, or hopeless? Not at all           6/19/2023     1:10 PM   Sleep History Reviewed With Patient   How many hours do you sleep at night? 7       MEDICATIONS:   Current Outpatient Medications   Medication Sig Dispense Refill     Semaglutide-Weight Management (WEGOVY) 0.25 MG/0.5ML pen Inject 0.25 mg  "Subcutaneous once a week 2 mL 0     MOUNJARO 5 MG/0.5ML pen INJECT 5 MG SUBCUTANEOUS EVERY 7 DAYS (Patient not taking: Reported on 6/20/2023) 2 mL 0       ALLERGIES:   Allergies   Allergen Reactions     Actos [Pioglitazone] Headache     Amoxicillin Hives            Metformin GI Disturbance     Pcn [Penicillin G] Hives       PHYSICAL EXAM:  Ht 1.753 m (5' 9\")   Wt 108.9 kg (240 lb)   BMI 35.44 kg/m      Waist circumference:      Wt Readings from Last 4 Encounters:   06/20/23 108.9 kg (240 lb)   03/28/23 109.8 kg (242 lb)   07/15/22 118 kg (260 lb 1 oz)   03/31/22 99.8 kg (220 lb)     A & O x 3  HEENT: NCAT, mucous membranes moist  Respirations unlabored  Location of obesity: Mixed Obesity    Lab reviewed   Latest Ref Rng 3/11/2023  9:03 AM   ENDO DIABETES     A1C (EXT) 0.0 - 5.7 % 5.9 ! (E)      Legend:  ! Abnormal  (E) External lab result     Latest Ref Rng 3/11/2023  9:03 AM   ENDO DIABETES     ALT (EXT) 6 - 29 U/L 23 (E)   AST 0 - 45 U/L    AST (EXT) 10 - 30 U/L 17 (E)      Legend:  (E) External lab result   Latest Ref [facility] 3/11/2023  9:03 AM   ENDO DIABETES     CREAT (EXT) 0.50 - 0.97 mg/dL 0.76 (E)      Legend:  (E) External lab result    ASSESSMENT/PLAN:  Syed is a patient with mature onset obesity with significant element of familial/genetic influence and with current health consequences. She does not need aggressive weight loss plan.  Syed Orantes eats a high carb diet, has perception of intense hunger, eats to obtain specific degree of fullness and mostly eats during the evening.    Her problem is complicated by poor lifestyle choices    Her ability to lose weight is impacted by current work life and lack of confidence.    PLAN:    Decrease portion sizes  Decrease eating out  Purge house of food triggers  Calorie/low fat diet  Meal planning    Craving/Reward   Ancillary testing:  N/A.  Food Plan:  High protein/low carbohydrate.   Activity Plan:  Exercise after meals.  Supplementary:  N/A. "   Medication:  The patient will begin medication in pursuit of improved medical status as influenced by body weight. She will increase Wegovy to 1.0 mg weekly.  There is a mutual understanding of the goals and risks of this therapy. The patient is in agreement. She is educated on dosage regimen and possible side effects.    FOLLOW-UP:   See Lauren Bloch, PharmD in 2 month(s)  See Dr.Tasma LIPSCOMB in 4 month(s).    Joined the call at 6/20/2023, 11:57:13 am.  Left the call at 6/20/2023, 12:09:19 pm.  You were on the call for 12 minutes 6 seconds .    External notes/medical records independently reviewed, labs and imaging independently reviewed, medical management and tests to be discussed/communicated to patient.    Time: I spent 21 minutes spent on the date of the encounter preparing to see patient (including chart review and preparation), obtaining and or reviewing additional medical history, performing a physical exam and evaluation, documenting clinical information in the electronic health record, independently interpreting results, communicating results to the patient and coordinating care.    Sincerely,    Hans Miller MD          Again, thank you for allowing me to participate in the care of your patient.      Sincerely,    Hans Miller MD

## 2023-06-20 NOTE — NURSING NOTE
Is the patient currently in the state of [place]? YES    Visit mode:VIDEO    If the visit is dropped, the patient can be reconnected by: VIDEO VISIT: Text to cell phone: [phone]    Will anyone else be joining the visit? NO      How would you like to obtain your AVS? MyChart    Are changes needed to the allergy or medication list? NO    Reason for visit: Consult    .    TREVOR Langston on 6/20/2023 at 11:42 AM

## 2023-06-20 NOTE — PROGRESS NOTES
"New Medical Weight Management Consult    PATIENT:  Syed Orantes  MRN:         [id number]  :         1990  MEGAN:         2023    Dear PCP,    I had the pleasure of seeing your patient, Syed Orantes. Full intake/assessment was done to determine barriers to weight loss success and develop a treatment plan. Syed Orantes is a 33 year old female interested in treatment of medical problems associated with excess weight. She has a height of 5' 9\", a weight of 240 lbs 0 oz, and the calculated Body mass index is 35.44 kg/m .    She has the following co-morbidities: prediabetes, PCOS    Concern of prediabetes A1c5.9% in 2023.    Weight gain after having twin daughter and work stress  Difficulty losing weight   Started Wegovy in 2023 -- Lost 20 lbs from 260 lbs to 240 lbs  It helps with less hungry and appetite. She feels that she has more energy.  No side effect with Wegovy.    Exercise --20-30 minutes walking and weight lifting    Sleep -- good 6-7hours per night         2023     1:10 PM   --   I have the following health issues associated with obesity Pre-Diabetes    Polycystic Ovarian Syndrome   I have the following symptoms associated with obesity None of the above            View : No data to display.                    2023     1:10 PM   Referring Provider   Please name the provider who referred you to Medical Weight Management  If you do not know, please answer \"I Don't Know\" Self referral           2023     1:10 PM   Weight History   How concerned are you about your weight? Very Concerned   I became overweight As an Adult   The following factors have contributed to my weight gain Change in Schedule    Eating Wrong Types of Food    Lack of Exercise    Stress   I have tried the following methods to lose weight Slim Fast or Other Liquid Diets    Medications    Fasting   My lowest weight since age 16 was 180   My highest weight since age 16 was 275   The most " weight I have ever lost was (lbs) 20   I have the following family history of obesity/being overweight My mother is overweight   How has your weight changed over the last year? Gained   How many pounds? 15 lbs           6/19/2023     1:10 PM   Diet Recall Review with Patient   If you do eat lunch, what types of food do you typically eat? salad, tuna, fruit, yogurt   If you do eat supper, what types of food do you typically eat? I usually cook with protein, veggies and fruit   How many glasses of juice do you drink in a typical day? 0   How many of glasses of milk do you drink in a typical day? 0   How many 8oz glasses of sugar containing drinks such as Talon-Aid/sweet tea do you drink in a day? 1   How many cans/bottles of sugar pop/soda/tea/sports drinks do you drink in a day? 0   How many cans/bottles of diet pop/soda/tea or sports drink do you drink in a day? 1   How often do you have a drink of alcohol? Monthly or Less   If you do drink, how many drinks might you have in a day? 1 or 2           6/19/2023     1:10 PM   Eating Habits   Generally, my meals include foods like these bread, pasta, rice, potatoes, corn, crackers, sweet dessert, pop, or juice A Few Times a Week   Generally, my meals include foods like these fried meats, brats, burgers, french fries, pizza, cheese, chips, or ice cream Once a Week   Eat fast food (like [employer], Burger Gildardo, [employer]) Once a Week   Eat at a buffet or sit-down restaurant Less Than Weekly   Eat most of my meals in front of the TV or computer Less Than Weekly   Often skip meals, eat at random times, have no regular eating times A Few Times a Week   Rarely sit down for a meal but snack or graze throughout Never   Eat extra snacks between meals Never   Eat most of my food at the end of the day Less Than Weekly   Eat in the middle of the night or wake up at night to eat Never   Eat extra snacks to prevent or correct low blood sugar Never   Eat to prevent acid reflux or stomach  pain Never   Worry about not having enough food to eat Never   I eat when I am depressed Never   I eat when I am stressed Less Than Weekly   I eat when I am bored Never   I eat when I am anxious Never   I eat when I am happy or as a reward Less Than Weekly   I feel hungry all the time even if I just have eaten Less Than Weekly   Feeling full is important to me Once a Week   I finish all the food on my plate even if I am already full Never   I can't resist eating delicious food or walk past the good food/smell Never   I eat/snack without noticing that I am eating Never   I eat more than usual when I see others eating Never   I have trouble not eating sweets, ice cream, cookies, or chips if they are around the house Less Than Weekly   I think about food all day Never   Please list any other foods you crave? Ice Cream           6/19/2023     1:10 PM   Amount of Food   I feel out of control when eating Monthly   I eat a large amount of food, like a loaf of bread, a box of cookies, a pint/quart of ice cream, all at once Never   I eat a large amount of food even when I am not hungry Never   I eat rapidly Never   I eat alone because I feel embarrassed and do not want others to see how much I have eaten Never   I eat until I am uncomfortably full Never   I feel bad, disgusted, or guilty after I overeat Never           6/19/2023     1:10 PM   Activity/Exercise History   How much of a typical 12 hour day do you spend sitting? Most of the Day   How much of a typical 12 hour day do you spend lying down? Less Than Half the Day   How much of a typical day do you spend walking/standing? Less Than Half the Day   How many hours (not including work) do you spend on the TV/Video Games/Computer/Tablet/Phone? 2-3 Hours   How many times a week are you active for the purpose of exercise? 2-3 Times a Week   What keeps you from being more active? Lack of Time    Unsure What To Do   How many total minutes do you spend doing some activity for  "the purpose of exercising when you exercise? 15-30 Minutes       PAST MEDICAL HISTORY:  Past Medical History:   Diagnosis Date     Cholelithiasis and acute cholecystitis with obstruction 2011     Cholelithiasis without obstruction 11/23/2011     Personal history of asthma     her asthma went away after she became active in high school sports.            6/19/2023     1:10 PM   Work/Social History Reviewed With Patient   My employment status is Full-Time   My job is Clinical informatics   How much of your job is spent on the computer or phone? 100%   How many hours do you spend commuting to work daily? 0   What is your marital status? /In a Relationship   If in a relationship, is your significant other overweight? No   If you have children, are they overweight? No   Who does the food shopping? Me           6/19/2023     1:10 PM   Mental Health History Reviewed With Patient   Have you ever been physically or sexually abused? No   How often in the past 2 weeks have you felt little interest or pleasure in doing things? Not at all   Over the past 2 weeks how often have you felt down, depressed, or hopeless? Not at all           6/19/2023     1:10 PM   Sleep History Reviewed With Patient   How many hours do you sleep at night? 7       MEDICATIONS:   Current Outpatient Medications   Medication Sig Dispense Refill     Semaglutide-Weight Management (WEGOVY) 0.25 MG/0.5ML pen Inject 0.25 mg Subcutaneous once a week 2 mL 0     MOUNJARO 5 MG/0.5ML pen INJECT 5 MG SUBCUTANEOUS EVERY 7 DAYS (Patient not taking: Reported on 6/20/2023) 2 mL 0       ALLERGIES:   Allergies   Allergen Reactions     Actos [Pioglitazone] Headache     Amoxicillin Hives            Metformin GI Disturbance     Pcn [Penicillin G] Hives       PHYSICAL EXAM:  Ht 1.753 m (5' 9\")   Wt 108.9 kg (240 lb)   BMI 35.44 kg/m      Waist circumference:      Wt Readings from Last 4 Encounters:   06/20/23 108.9 kg (240 lb)   03/28/23 109.8 kg (242 lb)   07/15/22 " 118 kg (260 lb 1 oz)   03/31/22 99.8 kg (220 lb)     A & O x 3  HEENT: NCAT, mucous membranes moist  Respirations unlabored  Location of obesity: Mixed Obesity    Lab reviewed   Latest Ref Rng 3/11/2023  9:03 AM   ENDO DIABETES     A1C (EXT) 0.0 - 5.7 % 5.9 ! (E)      Legend:  ! Abnormal  (E) External lab result     Latest Ref Rng 3/11/2023  9:03 AM   ENDO DIABETES     ALT (EXT) 6 - 29 U/L 23 (E)   AST 0 - 45 U/L    AST (EXT) 10 - 30 U/L 17 (E)      Legend:  (E) External lab result   Latest Ref Rng 3/11/2023  9:03 AM   ENDO DIABETES     CREAT (EXT) 0.50 - 0.97 mg/dL 0.76 (E)      Legend:  (E) External lab result    ASSESSMENT/PLAN:  Syed is a patient with mature onset obesity with significant element of familial/genetic influence and with current health consequences. She does not need aggressive weight loss plan.  Syed Orantes eats a high carb diet, has perception of intense hunger, eats to obtain specific degree of fullness and mostly eats during the evening.    Her problem is complicated by poor lifestyle choices    Her ability to lose weight is impacted by current work life and lack of confidence.    PLAN:    Decrease portion sizes  Decrease eating out  Purge house of food triggers  Calorie/low fat diet  Meal planning    Craving/Reward   Ancillary testing:  N/A.  Food Plan:  High protein/low carbohydrate.   Activity Plan:  Exercise after meals.  Supplementary:  N/A.   Medication:  The patient will begin medication in pursuit of improved medical status as influenced by body weight. She will increase Wegovy to 1.0 mg weekly.  There is a mutual understanding of the goals and risks of this therapy. The patient is in agreement. She is educated on dosage regimen and possible side effects.    FOLLOW-UP:   See Lauren Bloch, PharmD in 2 month(s)  See Dr.Tasma LIPSCOMB in 4 month(s).    Joined the call at 6/20/2023, 11:57:13 am.  Left the call at 6/20/2023, 12:09:19 pm.  You were on the call for 12 minutes 6 seconds  .    External notes/medical records independently reviewed, labs and imaging independently reviewed, medical management and tests to be discussed/communicated to patient.    Time: I spent 21 minutes spent on the date of the encounter preparing to see patient (including chart review and preparation), obtaining and or reviewing additional medical history, performing a physical exam and evaluation, documenting clinical information in the electronic health record, independently interpreting results, communicating results to the patient and coordinating care.    Sincerely,    Hans Miller MD

## 2023-06-20 NOTE — PATIENT INSTRUCTIONS
Increase Wegovy to 1.0 mg weekly    See Lauren Bloch, PharmD in 2 month(s)  See Dr.Tasma LIPSCOMB in 4 month(s)    If you have any questions, please do not hesitate to call Weight management clinic at [phone] or [phone].  If you need to fax, please fax to [phone].    Sincerely,    Hans Miller MD  Endocrinology

## 2023-06-20 NOTE — PROGRESS NOTES
Virtual Visit Details    Type of service:  Video Visit     Originating Location (pt. Location): Home    Distant Location (provider location):  Off-site  Platform used for Video Visit: Ivelisse

## 2023-06-26 ENCOUNTER — TELEPHONE (OUTPATIENT)
Dept: ENDOCRINOLOGY | Facility: CLINIC | Age: 33
End: 2023-06-26
Payer: COMMERCIAL

## 2023-06-26 NOTE — TELEPHONE ENCOUNTER
LVM and sent mychart for patient to schedule 4 mo ret mwm follow-up with Dr. Maxwell (around Oct 2023) and new-mtm appointment with Lauren Bloch in 2 months (around Aug 2023) per checkout orders

## 2023-08-09 ENCOUNTER — TELEPHONE (OUTPATIENT)
Dept: FAMILY MEDICINE | Facility: CLINIC | Age: 33
End: 2023-08-09
Payer: COMMERCIAL

## 2023-08-09 NOTE — TELEPHONE ENCOUNTER
Patient Quality Outreach    Patient is due for the following:   Cervical Cancer Screening - PAP Needed    Next Steps:   Schedule a office visit for pap    Type of outreach:    Sent Tastebuds message.      Questions for provider review:    None           AQUILINO MURRAY, CMA

## 2023-10-24 ENCOUNTER — OFFICE VISIT (OUTPATIENT)
Dept: OBGYN | Facility: CLINIC | Age: 33
End: 2023-10-24
Payer: COMMERCIAL

## 2023-10-24 VITALS
DIASTOLIC BLOOD PRESSURE: 78 MMHG | HEART RATE: 106 BPM | BODY MASS INDEX: 33.79 KG/M2 | SYSTOLIC BLOOD PRESSURE: 116 MMHG | OXYGEN SATURATION: 100 % | WEIGHT: 228.8 LBS

## 2023-10-24 DIAGNOSIS — N90.89 VULVAR LESION: ICD-10-CM

## 2023-10-24 DIAGNOSIS — Z01.411 ENCOUNTER FOR GYNECOLOGICAL EXAMINATION WITH ABNORMAL FINDING: Primary | ICD-10-CM

## 2023-10-24 DIAGNOSIS — Z12.4 PAP SMEAR FOR CERVICAL CANCER SCREENING: ICD-10-CM

## 2023-10-24 PROCEDURE — 99385 PREV VISIT NEW AGE 18-39: CPT | Mod: 25 | Performed by: OBSTETRICS & GYNECOLOGY

## 2023-10-24 PROCEDURE — 56605 BIOPSY OF VULVA/PERINEUM: CPT | Performed by: OBSTETRICS & GYNECOLOGY

## 2023-10-24 PROCEDURE — G0145 SCR C/V CYTO,THINLAYER,RESCR: HCPCS | Performed by: OBSTETRICS & GYNECOLOGY

## 2023-10-24 PROCEDURE — 87624 HPV HI-RISK TYP POOLED RSLT: CPT | Performed by: OBSTETRICS & GYNECOLOGY

## 2023-10-24 PROCEDURE — 88305 TISSUE EXAM BY PATHOLOGIST: CPT | Performed by: PATHOLOGY

## 2023-10-24 NOTE — PROGRESS NOTES
Syed Orantes is a 33 year old female , who presents for gyn exam.  No unusual bleeding, no incontinence, or unusual discharge.   She is currently using Miana for contraception.  She does not have any apparent contraindications to use.  She has not had any apparent complications with it's use.  Last cholesterol:   Recent Labs   Lab Test 22  0846 17  0800   CHOL  --  162   HDL  --  65   LDL  --  85   TRIG 66 60     Past Medical History:   Diagnosis Date    Cholelithiasis and acute cholecystitis with obstruction     Cholelithiasis without obstruction 2011    Personal history of asthma     her asthma went away after she became active in high school sports.        Past Surgical History:   Procedure Laterality Date     SECTION      CHOLECYSTECTOMY, LAPOROSCOPIC      COSMETIC SURGERY      breast reduction     HC INSERT IMPLANTABLE CONTRACEPTIVE CAPSULE      HC INSERTION INTRAUTERINE DEVICE      HC REMOVAL IMPLATABLE CONTRACEPTIVE CAPSULE      HC REMOVE INTRAUTERINE DEVICE         OB History    Para Term  AB Living   2 2 1 1 0 1   SAB IAB Ectopic Multiple Live Births   0 0 0 1 3      # Outcome Date GA Lbr Rush/2nd Weight Sex Delivery Anes PTL Lv   2A  10/27/17 33w1d  2.041 kg (4 lb 8 oz) F    LIVE BIRTH      Complications: Preeclampsia/Hypertension      Name: Patrick   2B  10/27/17 33w1d  1.786 kg (3 lb 15 oz) F    LIVE BIRTH      Complications: Preeclampsia/Hypertension      Name: Cee   1 Term 09 40w0d  3.175 kg (7 lb) M -SEC EPI  LISA      Birth Comments: OP presentation, dilated to 6cm, then emergency       Name: Codeion       Gyn History:  Gynecological History            No LMP recorded. (Menstrual status: IUD).     NA STD PID/Mirena IUD     history of abnormal pap smear: Maybe around the age of 15,   Last pap:   Lab Results   Component Value Date    PAP NIL 2016           Current Outpatient Medications    Medication Sig Dispense Refill    Semaglutide-Weight Management (WEGOVY) 1 MG/0.5ML pen Inject 1 mg Subcutaneous once a week 2 mL 3       Allergies   Allergen Reactions    Actos [Pioglitazone] Headache    Amoxicillin Hives           Metformin GI Disturbance    Pcn [Penicillin G] Hives       Social History     Socioeconomic History    Marital status:      Spouse name: Shaquille    Number of children: 1    Years of education: 14    Highest education level: Not on file   Occupational History    Occupation: [employer]     Employer: [employer]      Comment: [employer]   Tobacco Use    Smoking status: Never    Smokeless tobacco: Never   Vaping Use    Vaping Use: Never used   Substance and Sexual Activity    Alcohol use: Yes     Comment: socially -not in PG    Drug use: No    Sexual activity: Yes     Partners: Male     Birth control/protection: None   Other Topics Concern    Parent/sibling w/ CABG, MI or angioplasty before 65F 55M? No   Social History Narrative    Not on file     Social Determinants of Health     Financial Resource Strain: Not on file   Food Insecurity: Not on file   Transportation Needs: Not on file   Physical Activity: Not on file   Stress: Not on file   Social Connections: Not on file   Interpersonal Safety: Not on file   Housing Stability: Not on file       Family History   Problem Relation Age of Onset    Hypertension Mother     Hypertension Father     Thyroid Disease Maternal Grandmother     Hypertension Maternal Grandmother     Colon Cancer Maternal Grandmother         Great grandmother    Breast Cancer No family hx of          ROS:  All negative except as above.      EXAM:  /78 (BP Location: Right arm, Cuff Size: Adult Large)   Pulse 106   Wt 103.8 kg (228 lb 12.8 oz)   SpO2 100%   BMI 33.79 kg/m    General:  WNWD female, NAD  Alert  Oriented x 3  Gait:  Normal  Skin:  Normal skin turgor  Neurologic:  CN grossly intact, good sensation.    HEENT:  NC/AT, EOMI  Lungs:  good respiratory  effort  Breasts:  no masses palpated, no discharge expressed.  Scars seen consistent with breast reduction   Abdomen:  Non-tender, non-distended.  Vulva:  5 mm perfectly circumscribed lesion on the left medical aspect of the labia minora distal to the hymen, normal hair distribution, no adenopathy  BUS:  Normal, no masses noted  Urethra:  No hypermobility noted  Urethral meatus:  No masses noted  Vagina: Moist, pink, no abnormal discharge, well rugated, no lesions  Cervix: Smooth, pink, no visible lesions  Uterus: Normal size, anteverted, non-tender, mobile  Ovaries: No mass, non-tender, mobile  Perianal:  No masses noted.   Extremities:  No clubbing, cyanosis, or edema      ASSESSMENT/PLAN   Annual gyn exam with pap smear  Vulvar lesion:  recommend vulvar biopsy   Low fat diet, weight bearing exercises and self breast exams on a monthly basis have been recommended.  I have discussed with patient the risks, benefits, medications, treatment options and modalities.   I have instructed the patient to call or schedule a follow-up appointment if any problems.         PROCEDURE NOTE  The procedure was reviewed with patient.  After consenting to the procedure she was placed into the dorsal lithotomy position.  The examination was performed.  The medial left labia minor was prepped with Betadine.  1% lidocaine was used for analgesia.  4 mm punch biopsy was performed.  The tissue was removed and hemostasis was achieved with Silver Nitrate.  She tolerated the procedure well.   Instruments were removed and the specimen was sent to pathology.  Results to the patient in 1 week,  when available.   Eduardo Arthur MD

## 2023-10-26 LAB
BKR LAB AP GYN ADEQUACY: NORMAL
BKR LAB AP GYN INTERPRETATION: NORMAL
BKR LAB AP HPV REFLEX: NORMAL
BKR LAB AP PREVIOUS ABNORMAL: NORMAL
PATH REPORT.COMMENTS IMP SPEC: NORMAL
PATH REPORT.FINAL DX SPEC: NORMAL
PATH REPORT.GROSS SPEC: NORMAL
PATH REPORT.MICROSCOPIC SPEC OTHER STN: NORMAL
PATH REPORT.RELEVANT HX SPEC: NORMAL
PATH REPORT.RELEVANT HX SPEC: NORMAL
PHOTO IMAGE: NORMAL

## 2023-10-30 LAB
HUMAN PAPILLOMA VIRUS 16 DNA: NEGATIVE
HUMAN PAPILLOMA VIRUS 18 DNA: NEGATIVE
HUMAN PAPILLOMA VIRUS FINAL DIAGNOSIS: NORMAL
HUMAN PAPILLOMA VIRUS OTHER HR: NEGATIVE

## 2023-12-11 ENCOUNTER — OFFICE VISIT (OUTPATIENT)
Dept: FAMILY MEDICINE | Facility: CLINIC | Age: 33
End: 2023-12-11
Payer: COMMERCIAL

## 2023-12-11 VITALS
SYSTOLIC BLOOD PRESSURE: 120 MMHG | RESPIRATION RATE: 16 BRPM | OXYGEN SATURATION: 100 % | HEIGHT: 69 IN | DIASTOLIC BLOOD PRESSURE: 76 MMHG | BODY MASS INDEX: 33.44 KG/M2 | WEIGHT: 225.8 LBS | HEART RATE: 96 BPM

## 2023-12-11 DIAGNOSIS — J45.909 MILD ASTHMA WITHOUT COMPLICATION, UNSPECIFIED WHETHER PERSISTENT: ICD-10-CM

## 2023-12-11 DIAGNOSIS — Z01.818 PRE-OPERATIVE GENERAL PHYSICAL EXAMINATION: Primary | ICD-10-CM

## 2023-12-11 DIAGNOSIS — E66.811 CLASS 1 OBESITY DUE TO EXCESS CALORIES WITHOUT SERIOUS COMORBIDITY WITH BODY MASS INDEX (BMI) OF 33.0 TO 33.9 IN ADULT: ICD-10-CM

## 2023-12-11 DIAGNOSIS — Z97.5 IUD CONTRACEPTION: ICD-10-CM

## 2023-12-11 DIAGNOSIS — E66.09 CLASS 1 OBESITY DUE TO EXCESS CALORIES WITHOUT SERIOUS COMORBIDITY WITH BODY MASS INDEX (BMI) OF 33.0 TO 33.9 IN ADULT: ICD-10-CM

## 2023-12-11 LAB
ABO/RH(D): NORMAL
ALBUMIN SERPL BCG-MCNC: 4.5 G/DL (ref 3.5–5.2)
ALP SERPL-CCNC: 68 U/L (ref 40–150)
ALT SERPL W P-5'-P-CCNC: 13 U/L (ref 0–50)
ANION GAP SERPL CALCULATED.3IONS-SCNC: 9 MMOL/L (ref 7–15)
APTT PPP: 32 SECONDS (ref 22–38)
AST SERPL W P-5'-P-CCNC: 19 U/L (ref 0–45)
BASOPHILS # BLD AUTO: 0 10E3/UL (ref 0–0.2)
BASOPHILS NFR BLD AUTO: 0 %
BILIRUB SERPL-MCNC: 0.3 MG/DL
BUN SERPL-MCNC: 5.6 MG/DL (ref 6–20)
CALCIUM SERPL-MCNC: 9.2 MG/DL (ref 8.6–10)
CHLORIDE SERPL-SCNC: 106 MMOL/L (ref 98–107)
CREAT SERPL-MCNC: 0.78 MG/DL (ref 0.51–0.95)
DEPRECATED HCO3 PLAS-SCNC: 23 MMOL/L (ref 22–29)
EGFRCR SERPLBLD CKD-EPI 2021: >90 ML/MIN/1.73M2
EOSINOPHIL # BLD AUTO: 0.2 10E3/UL (ref 0–0.7)
EOSINOPHIL NFR BLD AUTO: 3 %
ERYTHROCYTE [DISTWIDTH] IN BLOOD BY AUTOMATED COUNT: 12.7 % (ref 10–15)
GLUCOSE SERPL-MCNC: 95 MG/DL (ref 70–99)
HCG SERPL QL: NEGATIVE
HCT VFR BLD AUTO: 38.4 % (ref 35–47)
HGB BLD-MCNC: 12.7 G/DL (ref 11.7–15.7)
HIV 1+2 AB+HIV1 P24 AG SERPL QL IA: NONREACTIVE
IMM GRANULOCYTES # BLD: 0 10E3/UL
IMM GRANULOCYTES NFR BLD: 0 %
INR PPP: 1.06 (ref 0.85–1.15)
LYMPHOCYTES # BLD AUTO: 1.3 10E3/UL (ref 0.8–5.3)
LYMPHOCYTES NFR BLD AUTO: 21 %
MCH RBC QN AUTO: 31.2 PG (ref 26.5–33)
MCHC RBC AUTO-ENTMCNC: 33.1 G/DL (ref 31.5–36.5)
MCV RBC AUTO: 94 FL (ref 78–100)
MONOCYTES # BLD AUTO: 0.5 10E3/UL (ref 0–1.3)
MONOCYTES NFR BLD AUTO: 8 %
NEUTROPHILS # BLD AUTO: 4.2 10E3/UL (ref 1.6–8.3)
NEUTROPHILS NFR BLD AUTO: 68 %
PLATELET # BLD AUTO: 245 10E3/UL (ref 150–450)
POTASSIUM SERPL-SCNC: 4.3 MMOL/L (ref 3.4–5.3)
PROT SERPL-MCNC: 7.4 G/DL (ref 6.4–8.3)
RBC # BLD AUTO: 4.07 10E6/UL (ref 3.8–5.2)
SODIUM SERPL-SCNC: 138 MMOL/L (ref 135–145)
SPECIMEN EXPIRATION DATE: NORMAL
WBC # BLD AUTO: 6.2 10E3/UL (ref 4–11)

## 2023-12-11 PROCEDURE — 99214 OFFICE O/P EST MOD 30 MIN: CPT | Performed by: FAMILY MEDICINE

## 2023-12-11 PROCEDURE — 86803 HEPATITIS C AB TEST: CPT | Performed by: FAMILY MEDICINE

## 2023-12-11 PROCEDURE — 84703 CHORIONIC GONADOTROPIN ASSAY: CPT | Performed by: FAMILY MEDICINE

## 2023-12-11 PROCEDURE — 36415 COLL VENOUS BLD VENIPUNCTURE: CPT | Performed by: FAMILY MEDICINE

## 2023-12-11 PROCEDURE — 87389 HIV-1 AG W/HIV-1&-2 AB AG IA: CPT | Performed by: FAMILY MEDICINE

## 2023-12-11 PROCEDURE — 80053 COMPREHEN METABOLIC PANEL: CPT | Performed by: FAMILY MEDICINE

## 2023-12-11 PROCEDURE — 86900 BLOOD TYPING SEROLOGIC ABO: CPT | Performed by: FAMILY MEDICINE

## 2023-12-11 PROCEDURE — 85610 PROTHROMBIN TIME: CPT | Performed by: FAMILY MEDICINE

## 2023-12-11 PROCEDURE — 86901 BLOOD TYPING SEROLOGIC RH(D): CPT | Performed by: FAMILY MEDICINE

## 2023-12-11 PROCEDURE — 85025 COMPLETE CBC W/AUTO DIFF WBC: CPT | Performed by: FAMILY MEDICINE

## 2023-12-11 PROCEDURE — 85730 THROMBOPLASTIN TIME PARTIAL: CPT | Performed by: FAMILY MEDICINE

## 2023-12-11 ASSESSMENT — PAIN SCALES - GENERAL: PAINLEVEL: NO PAIN (0)

## 2023-12-11 NOTE — PROGRESS NOTES
[facility]  [address]  [place] [address]  Phone: [phone]  Primary Provider: Doe Bliss  Pre-op Performing Provider: DOE BLISS    PREOPERATIVE EVALUATION:  Today's date: 12/11/2023    Syed is a 33 year old, presenting for the following:  Pre-Op Exam        12/11/2023     7:22 AM   Additional Questions   Roomed by STEWART ORTEGA   Accompanied by self     Surgical Information:  Surgery/Procedure: Tummy Tuck  Surgery Location: [place]   Surgeon: Shari /  Surgery Date: 1/8/2024  Time of Surgery: 7:00am   Where patient plans to recover: Other:   Fax number for surgical facility: Print Copy    Assessment & Plan     The proposed surgical procedure is considered INTERMEDIATE risk.    Pre-operative general physical examination   No additional risks identified; perioperative medication management discussed  - Hepatitis C Screen Reflex to HCV RNA Quant and Genotype; Future  - HIV Antigen Antibody Combo; Future  - CBC with platelets and differential; Future  - Comprehensive metabolic panel (BMP + Alb, Alk Phos, ALT, AST, Total. Bili, TP); Future  - HCG qualitative urine; Future  - ABO and Rh; Future  - INR; Future  - Partial thromboplastin time; Future    Class 1 obesity, without serious comorbidity with body mass index (BMI) of 33.0 to 33.9 in adult    - on a GLP-1 injection.    Mild asthma without complication, unspecified whether persistent   -  resolved at this time.    IUD contraception    - has IUD in place       - No identified additional risk factors other than previously addressed    Antiplatelet or Anticoagulation Medication Instructions:   - Patient is on no antiplatelet or anticoagulation medications.    Additional Medication Instructions:   - GLP-1 Injectable (exenitide, liraglutide, semaglutide, dulaglutide, etc.): HOLD 7 days before surgery     RECOMMENDATION:  APPROVAL GIVEN to proceed with proposed procedure, without further diagnostic  evaluation.      Subjective       HPI related to upcoming procedure: Patient planned for a tummy tuck        12/7/2023    10:06 PM   Preop Questions   1. Have you ever had a heart attack or stroke? No   2. Have you ever had surgery on your heart or blood vessels, such as a stent placement, a coronary artery bypass, or surgery on an artery in your head, neck, heart, or legs? No   3. Do you have chest pain with activity? No   4. Do you have a history of  heart failure? No   5. Do you currently have a cold, bronchitis or symptoms of other infection? No   6. Do you have a cough, shortness of breath, or wheezing? No   7. Do you or anyone in your family have previous history of blood clots? No   8. Do you or does anyone in your family have a serious bleeding problem such as prolonged bleeding following surgeries or cuts? No   9. Have you ever had problems with anemia or been told to take iron pills? YES - taking iron, had anemia during pregnancy (2017)   10. Have you had any abnormal blood loss such as black, tarry or bloody stools, or abnormal vaginal bleeding? No   11. Have you ever had a blood transfusion? No   12. Are you willing to have a blood transfusion if it is medically needed before, during, or after your surgery? Yes   13. Have you or any of your relatives ever had problems with anesthesia? YES - had shakes during once after anesthesia   14. Do you have sleep apnea, excessive snoring or daytime drowsiness? No   15. Do you have any artifical heart valves or other implanted medical devices like a pacemaker, defibrillator, or continuous glucose monitor? No   16. Do you have artificial joints? No   17. Are you allergic to latex? No   18. Is there any chance that you may be pregnant? No       Health Care Directive:  Patient does not have a Health Care Directive or Living Will: Discussed advance care planning with patient; however, patient declined at this time.    Preoperative Review of :   reviewed - no  record of controlled substances prescribed.      Status of Chronic Conditions:  See problem list for active medical problems.  Problems all longstanding and stable, except as noted/documented.  See ROS for pertinent symptoms related to these conditions.    Review of Systems  Constitutional, neuro, ENT, endocrine, pulmonary, cardiac, gastrointestinal, genitourinary, musculoskeletal, integument and psychiatric systems are negative, except as otherwise noted.    Patient Active Problem List    Diagnosis Date Noted    Excess skin of abdominal wall 2018     Priority: Medium    Elevated blood pressure affecting pregnancy in third trimester, antepartum 10/26/2017     Priority: Medium    Dizygotic twins 2017     Priority: Medium     IMO Regulatory Load OCT 2020      Pregnancy with history of infertility, second trimester 2017     Priority: Medium    Pregnancy with history of infertility 2017     Priority: Medium    Primary anovulatory infertility 2017     Priority: Medium    Female infertility 2017     Priority: Medium    Menorrhagia 2013     Priority: Medium     High factor 8       Acne 2013     Priority: Medium    CARDIOVASCULAR SCREENING; LDL GOAL LESS THAN 160 2011     Priority: Medium      Past Medical History:   Diagnosis Date    Cholelithiasis and acute cholecystitis with obstruction     Cholelithiasis without obstruction 2011    Personal history of asthma     her asthma went away after she became active in high school sports.      Past Surgical History:   Procedure Laterality Date     SECTION      CHOLECYSTECTOMY, LAPOROSCOPIC      COSMETIC SURGERY      breast reduction     HC INSERT IMPLANTABLE CONTRACEPTIVE CAPSULE      HC INSERTION INTRAUTERINE DEVICE      HC REMOVAL IMPLATABLE CONTRACEPTIVE CAPSULE      HC REMOVE INTRAUTERINE DEVICE       Current Outpatient Medications   Medication Sig Dispense Refill    Semaglutide-Weight  "Management (WEGOVY) 1 MG/0.5ML pen Inject 1 mg Subcutaneous once a week 2 mL 3       Allergies   Allergen Reactions    Actos [Pioglitazone] Headache    Amoxicillin Hives           Metformin GI Disturbance    Pcn [Penicillin G] Hives        Social History     Tobacco Use    Smoking status: Never    Smokeless tobacco: Never   Substance Use Topics    Alcohol use: Yes     Comment: socially -not in PG     Family History   Problem Relation Age of Onset    Hypertension Mother     Hypertension Father     Thyroid Disease Maternal Grandmother     Hypertension Maternal Grandmother     Colon Cancer Maternal Grandmother         Great grandmother    Breast Cancer No family hx of      History   Drug Use No         Objective     /76 (BP Location: Left arm, Cuff Size: Adult Regular)   Pulse 96   Resp 16   Ht 1.76 m (5' 9.29\")   Wt 102.4 kg (225 lb 12.8 oz)   SpO2 100%   BMI 33.07 kg/m      Physical Exam    GENERAL APPEARANCE: healthy, alert and no distress     HENT: ear canals and TM's normal and nose and mouth without ulcers or lesions     NECK: no adenopathy and thyroid normal to palpation     RESP: lungs clear to auscultation - no rales, rhonchi or wheezes     CV: regular rates and rhythm, normal S1 S2, no S3 or S4 and no murmur, click or rub     ABDOMEN:  soft, nontender, no HSM or masses and bowel sounds normal     MS: extremities normal- no gross deformities noted.     SKIN: no suspicious lesions or rashes     NEURO: Normal strength and tone,  mentation intact and speech normal     PSYCH: mentation appears normal. and affect normal/bright    Diagnostics:  Results for orders placed or performed in visit on 12/11/23   HIV Antigen Antibody Combo     Status: Normal   Result Value Ref Range    HIV Antigen Antibody Combo Nonreactive Nonreactive   Comprehensive metabolic panel (BMP + Alb, Alk Phos, ALT, AST, Total. Bili, TP)     Status: Abnormal   Result Value Ref Range    Sodium 138 135 - 145 mmol/L    Potassium 4.3 3.4 " - 5.3 mmol/L    Carbon Dioxide (CO2) 23 22 - 29 mmol/L    Anion Gap 9 7 - 15 mmol/L    Urea Nitrogen 5.6 (L) 6.0 - 20.0 mg/dL    Creatinine 0.78 0.51 - 0.95 mg/dL    GFR Estimate >90 >60 mL/min/1.73m2    Calcium 9.2 8.6 - 10.0 mg/dL    Chloride 106 98 - 107 mmol/L    Glucose 95 70 - 99 mg/dL    Alkaline Phosphatase 68 40 - 150 U/L    AST 19 0 - 45 U/L    ALT 13 0 - 50 U/L    Protein Total 7.4 6.4 - 8.3 g/dL    Albumin 4.5 3.5 - 5.2 g/dL    Bilirubin Total 0.3 <=1.2 mg/dL   INR     Status: Normal   Result Value Ref Range    INR 1.06 0.85 - 1.15   CBC with platelets and differential     Status: None   Result Value Ref Range    WBC Count 6.2 4.0 - 11.0 10e3/uL    RBC Count 4.07 3.80 - 5.20 10e6/uL    Hemoglobin 12.7 11.7 - 15.7 g/dL    Hematocrit 38.4 35.0 - 47.0 %    MCV 94 78 - 100 fL    MCH 31.2 26.5 - 33.0 pg    MCHC 33.1 31.5 - 36.5 g/dL    RDW 12.7 10.0 - 15.0 %    Platelet Count 245 150 - 450 10e3/uL    % Neutrophils 68 %    % Lymphocytes 21 %    % Monocytes 8 %    % Eosinophils 3 %    % Basophils 0 %    % Immature Granulocytes 0 %    Absolute Neutrophils 4.2 1.6 - 8.3 10e3/uL    Absolute Lymphocytes 1.3 0.8 - 5.3 10e3/uL    Absolute Monocytes 0.5 0.0 - 1.3 10e3/uL    Absolute Eosinophils 0.2 0.0 - 0.7 10e3/uL    Absolute Basophils 0.0 0.0 - 0.2 10e3/uL    Absolute Immature Granulocytes 0.0 <=0.4 10e3/uL   Partial thromboplastin time     Status: Normal   Result Value Ref Range    aPTT 32 22 - 38 Seconds   hCG Qualitative Pregnancy     Status: Normal   Result Value Ref Range    hCG Serum Qualitative Negative Negative   ABO and Rh     Status: None   Result Value Ref Range    ABO/RH(D) B POS     SPECIMEN EXPIRATION DATE [id number]    CBC with platelets and differential     Status: None    Narrative    The following orders were created for panel order CBC with platelets and differential.  Procedure                               Abnormality         Status                     ---------                                -----------         ------                     CBC with platelets and d...[[id number]]                      Final result                 Please view results for these tests on the individual orders.     No EKG required, no history of coronary heart disease, significant arrhythmia, peripheral arterial disease or other structural heart disease.    Revised Cardiac Risk Index (RCRI):  The patient has the following serious cardiovascular risks for perioperative complications:   - No serious cardiac risks = 0 points     RCRI Interpretation: 0 points: Class I (very low risk - 0.4% complication rate)       Signed Electronically by: Donavon Mehta MD  Copy of this evaluation report is provided to requesting physician.

## 2023-12-12 LAB — HCV AB SERPL QL IA: NONREACTIVE

## 2024-07-18 ASSESSMENT — ASTHMA QUESTIONNAIRES
ACT_TOTALSCORE: 25
QUESTION_2 LAST FOUR WEEKS HOW OFTEN HAVE YOU HAD SHORTNESS OF BREATH: NOT AT ALL
QUESTION_4 LAST FOUR WEEKS HOW OFTEN HAVE YOU USED YOUR RESCUE INHALER OR NEBULIZER MEDICATION (SUCH AS ALBUTEROL): NOT AT ALL
QUESTION_5 LAST FOUR WEEKS HOW WOULD YOU RATE YOUR ASTHMA CONTROL: COMPLETELY CONTROLLED
QUESTION_3 LAST FOUR WEEKS HOW OFTEN DID YOUR ASTHMA SYMPTOMS (WHEEZING, COUGHING, SHORTNESS OF BREATH, CHEST TIGHTNESS OR PAIN) WAKE YOU UP AT NIGHT OR EARLIER THAN USUAL IN THE MORNING: NOT AT ALL
QUESTION_1 LAST FOUR WEEKS HOW MUCH OF THE TIME DID YOUR ASTHMA KEEP YOU FROM GETTING AS MUCH DONE AT WORK, SCHOOL OR AT HOME: NONE OF THE TIME
ACT_TOTALSCORE: 25

## 2024-07-22 ENCOUNTER — OFFICE VISIT (OUTPATIENT)
Dept: FAMILY MEDICINE | Facility: CLINIC | Age: 34
End: 2024-07-22
Payer: COMMERCIAL

## 2024-07-22 VITALS
DIASTOLIC BLOOD PRESSURE: 76 MMHG | SYSTOLIC BLOOD PRESSURE: 109 MMHG | OXYGEN SATURATION: 99 % | HEART RATE: 76 BPM | WEIGHT: 218 LBS | BODY MASS INDEX: 32.29 KG/M2 | RESPIRATION RATE: 16 BRPM | TEMPERATURE: 98.3 F | HEIGHT: 69 IN

## 2024-07-22 DIAGNOSIS — Z01.818 PREOP GENERAL PHYSICAL EXAM: Primary | ICD-10-CM

## 2024-07-22 DIAGNOSIS — E66.811 CLASS 1 OBESITY: ICD-10-CM

## 2024-07-22 DIAGNOSIS — J45.20 MILD INTERMITTENT ASTHMA WITHOUT COMPLICATION: ICD-10-CM

## 2024-07-22 LAB
ALBUMIN SERPL BCG-MCNC: 4.4 G/DL (ref 3.5–5.2)
ALP SERPL-CCNC: 68 U/L (ref 40–150)
ALT SERPL W P-5'-P-CCNC: 16 U/L (ref 0–50)
ANION GAP SERPL CALCULATED.3IONS-SCNC: 9 MMOL/L (ref 7–15)
AST SERPL W P-5'-P-CCNC: 17 U/L (ref 0–45)
BASOPHILS # BLD AUTO: 0 10E3/UL (ref 0–0.2)
BASOPHILS NFR BLD AUTO: 0 %
BILIRUB SERPL-MCNC: 0.2 MG/DL
BUN SERPL-MCNC: 8.4 MG/DL (ref 6–20)
CALCIUM SERPL-MCNC: 9.4 MG/DL (ref 8.8–10.4)
CHLORIDE SERPL-SCNC: 103 MMOL/L (ref 98–107)
CREAT SERPL-MCNC: 0.77 MG/DL (ref 0.51–0.95)
EGFRCR SERPLBLD CKD-EPI 2021: >90 ML/MIN/1.73M2
EOSINOPHIL # BLD AUTO: 0.1 10E3/UL (ref 0–0.7)
EOSINOPHIL NFR BLD AUTO: 1 %
ERYTHROCYTE [DISTWIDTH] IN BLOOD BY AUTOMATED COUNT: 13.2 % (ref 10–15)
GLUCOSE SERPL-MCNC: 93 MG/DL (ref 70–99)
HCG SERPL QL: NEGATIVE
HCO3 SERPL-SCNC: 25 MMOL/L (ref 22–29)
HCT VFR BLD AUTO: 36.8 % (ref 35–47)
HGB BLD-MCNC: 12.3 G/DL (ref 11.7–15.7)
HIV 1+2 AB+HIV1 P24 AG SERPL QL IA: NONREACTIVE
IMM GRANULOCYTES # BLD: 0 10E3/UL
IMM GRANULOCYTES NFR BLD: 0 %
LYMPHOCYTES # BLD AUTO: 1.4 10E3/UL (ref 0.8–5.3)
LYMPHOCYTES NFR BLD AUTO: 20 %
MCH RBC QN AUTO: 31.5 PG (ref 26.5–33)
MCHC RBC AUTO-ENTMCNC: 33.4 G/DL (ref 31.5–36.5)
MCV RBC AUTO: 94 FL (ref 78–100)
MONOCYTES # BLD AUTO: 0.6 10E3/UL (ref 0–1.3)
MONOCYTES NFR BLD AUTO: 8 %
NEUTROPHILS # BLD AUTO: 4.9 10E3/UL (ref 1.6–8.3)
NEUTROPHILS NFR BLD AUTO: 70 %
PLATELET # BLD AUTO: 226 10E3/UL (ref 150–450)
POTASSIUM SERPL-SCNC: 4.1 MMOL/L (ref 3.4–5.3)
PROT SERPL-MCNC: 7.6 G/DL (ref 6.4–8.3)
RBC # BLD AUTO: 3.9 10E6/UL (ref 3.8–5.2)
SODIUM SERPL-SCNC: 137 MMOL/L (ref 135–145)
WBC # BLD AUTO: 7 10E3/UL (ref 4–11)

## 2024-07-22 PROCEDURE — 85610 PROTHROMBIN TIME: CPT | Performed by: FAMILY MEDICINE

## 2024-07-22 PROCEDURE — 99214 OFFICE O/P EST MOD 30 MIN: CPT | Performed by: FAMILY MEDICINE

## 2024-07-22 PROCEDURE — 36415 COLL VENOUS BLD VENIPUNCTURE: CPT | Performed by: FAMILY MEDICINE

## 2024-07-22 PROCEDURE — 85730 THROMBOPLASTIN TIME PARTIAL: CPT | Performed by: FAMILY MEDICINE

## 2024-07-22 PROCEDURE — 87389 HIV-1 AG W/HIV-1&-2 AB AG IA: CPT | Performed by: FAMILY MEDICINE

## 2024-07-22 PROCEDURE — 84703 CHORIONIC GONADOTROPIN ASSAY: CPT | Performed by: FAMILY MEDICINE

## 2024-07-22 PROCEDURE — 85025 COMPLETE CBC W/AUTO DIFF WBC: CPT | Performed by: FAMILY MEDICINE

## 2024-07-22 PROCEDURE — 80053 COMPREHEN METABOLIC PANEL: CPT | Performed by: FAMILY MEDICINE

## 2024-07-22 NOTE — PROGRESS NOTES
Pre[facility] Evaluation  [facility]  [address]  [place] [address]  Phone: [phone]  Primary Provider: Donavon Mehta MD  Pre-op Performing Provider: Donavon Mehta MD  Jul 22, 2024 7/18/2024   Surgical Information   What procedure is being done? Revision on tummy tuck (small dog ears)   Facility or Hospital where procedure/surgery will be performed[facility]   Who is doing the procedure / surgery? Dr. Lopez   Date of surgery / procedure: 07/29/24   Time of surgery / procedure: 7:00am   Where do you plan to recover after surgery? at a rehab center      Fax number for surgical facility: Send on NurseGrid    Assessment & Plan     The proposed surgical procedure is considered LOW risk.    Preop general physical exam     Blood type: ABO/RH(D)  B POS    Perioperative medication management discussed  - CBC with platelets and differential  - Comprehensive metabolic panel (BMP + Alb, Alk Phos, ALT, AST, Total. Bili, TP)  - HIV Antigen Antibody Combo  - INR  - Partial thromboplastin time  - HCG qualitative    Class 1 obesity    - Had a tummy tuck, getting a revision    Mild intermittent asthma without complication   - asymptomatic     - No identified additional risk factors other than previously addressed    Antiplatelet or Anticoagulation Medication Instructions   - Patient is on no antiplatelet or anticoagulation medications.    Additional Medication Instructions   - GLP-1 oral semaglutide: DO NOT TAKE 7 days before surgery    Recommendation  Approval given to proceed with proposed procedure, without further diagnostic evaluation.    Mejia Lynch is a 34 year old, presenting for the following:  Pre-Op Exam (DOS 7/29/24)          7/22/2024     7:21 AM   Additional Questions   Roomed by brook         7/22/2024   Forms   Any forms needing to be completed Yes        HPI related to upcoming procedure: Patient planned for revision surgery  following tummy tuck surgery        7/18/2024   Pre-Op Questionnaire   Have you ever had a heart attack or stroke? No   Have you ever had surgery on your heart or blood vessels, such as a stent placement, a coronary artery bypass, or surgery on an artery in your head, neck, heart, or legs? No   Do you have chest pain with activity? No   Do you have a history of heart failure? No   Do you currently have a cold, bronchitis or symptoms of other infection? No   Do you have a cough, shortness of breath, or wheezing? No   Do you or anyone in your family have previous history of blood clots? No   Do you or does anyone in your family have a serious bleeding problem such as prolonged bleeding following surgeries or cuts? No   Have you ever had problems with anemia or been told to take iron pills? (!) YES    Have you had any abnormal blood loss such as black, tarry or bloody stools, or abnormal vaginal bleeding? No   Have you ever had a blood transfusion? No   Are you willing to have a blood transfusion if it is medically needed before, during, or after your surgery? Yes   Have you or any of your relatives ever had problems with anesthesia? No   Do you have sleep apnea, excessive snoring or daytime drowsiness? No   Do you have any artifical heart valves or other implanted medical devices like a pacemaker, defibrillator, or continuous glucose monitor? No   Do you have artificial joints? No   Are you allergic to latex? No        Health Care Directive  Patient does not have a Health Care Directive or Living Will: Discussed advance care planning with patient; however, patient declined at this time.    Preoperative Review of    reviewed - no record of controlled substances prescribed.      Status of Chronic Conditions:  See problem list for active medical problems.  Problems all longstanding and stable, except as noted/documented.  See ROS for pertinent symptoms related to these conditions.    Patient Active Problem List     Diagnosis Date Noted    Excess skin of abdominal wall 2018     Priority: Medium    Elevated blood pressure affecting pregnancy in third trimester, antepartum 10/26/2017     Priority: Medium    Dizygotic twins 2017     Priority: Medium     IMO Regulatory Load OCT 2020      Pregnancy with history of infertility, second trimester 2017     Priority: Medium    Pregnancy with history of infertility 2017     Priority: Medium    Primary anovulatory infertility 2017     Priority: Medium    Female infertility 2017     Priority: Medium    Menorrhagia 2013     Priority: Medium     High factor 8       Acne 2013     Priority: Medium    CARDIOVASCULAR SCREENING; LDL GOAL LESS THAN 160 2011     Priority: Medium      Past Medical History:   Diagnosis Date    Cholelithiasis and acute cholecystitis with obstruction     Cholelithiasis without obstruction 2011    Personal history of asthma     her asthma went away after she became active in high school sports.      Past Surgical History:   Procedure Laterality Date     SECTION      CHOLECYSTECTOMY, LAPOROSCOPIC      COSMETIC SURGERY      breast reduction     HC INSERT IMPLANTABLE CONTRACEPTIVE CAPSULE      HC INSERTION INTRAUTERINE DEVICE      HC REMOVAL IMPLATABLE CONTRACEPTIVE CAPSULE      HC REMOVE INTRAUTERINE DEVICE       Current Outpatient Medications   Medication Sig Dispense Refill    Semaglutide-Weight Management (WEGOVY) 1 MG/0.5ML pen Inject 1 mg Subcutaneous once a week (Patient not taking: Reported on 2024) 2 mL 3       Allergies   Allergen Reactions    Actos [Pioglitazone] Headache    Amoxicillin Hives           Metformin GI Disturbance    Pcn [Penicillin G] Hives        Social History     Tobacco Use    Smoking status: Never    Smokeless tobacco: Never   Substance Use Topics    Alcohol use: Yes     Comment: socially -not in PG     Family History   Problem Relation Age of Onset     "Hypertension Mother     Hypertension Father     Thyroid Disease Maternal Grandmother     Hypertension Maternal Grandmother     Colon Cancer Maternal Grandmother         Great grandmother    Breast Cancer No family hx of      History   Drug Use No         Review of Systems  Constitutional, HEENT, cardiovascular, pulmonary, gi and gu systems are negative, except as otherwise noted.    Objective    /76   Pulse 76   Temp 98.3  F (36.8  C) (Temporal)   Resp 16   Ht 1.76 m (5' 9.29\")   Wt 98.9 kg (218 lb)   SpO2 99%   BMI 31.92 kg/m     Estimated body mass index is 31.92 kg/m  as calculated from the following:    Height as of this encounter: 1.76 m (5' 9.29\").    Weight as of this encounter: 98.9 kg (218 lb).  Physical Exam  GENERAL: alert and no distress  EYES: Eyes grossly normal to inspection, PERRL and conjunctivae and sclerae normal  HENT: ear canals and TM's normal, nose and mouth without ulcers or lesions  NECK: no adenopathy, no asymmetry, masses, or scars  RESP: lungs clear to auscultation - no rales, rhonchi or wheezes  CV: regular rate and rhythm, no murmur, click or rub, no peripheral edema  ABDOMEN: soft, nontender, no masses and bowel sounds normal  MS: no gross musculoskeletal defects noted, no edema  SKIN: no suspicious lesions or rashes  NEURO: Normal strength and tone, mentation intact and speech normal  PSYCH: mentation appears normal, affect normal/bright    Recent Labs   Lab Test 12/11/23  0803   HGB 12.7      INR 1.06      POTASSIUM 4.3   CR 0.78        Diagnostics  Labs pending at this time.    Results will be reviewed when available.   Results for orders placed or performed in visit on 07/22/24   Comprehensive metabolic panel (BMP + Alb, Alk Phos, ALT, AST, Total. Bili, TP)     Status: Normal   Result Value Ref Range    Sodium 137 135 - 145 mmol/L    Potassium 4.1 3.4 - 5.3 mmol/L    Carbon Dioxide (CO2) 25 22 - 29 mmol/L    Anion Gap 9 7 - 15 mmol/L    Urea Nitrogen 8.4 " 6.0 - 20.0 mg/dL    Creatinine 0.77 0.51 - 0.95 mg/dL    GFR Estimate >90 >60 mL/min/1.73m2    Calcium 9.4 8.8 - 10.4 mg/dL    Chloride 103 98 - 107 mmol/L    Glucose 93 70 - 99 mg/dL    Alkaline Phosphatase 68 40 - 150 U/L    AST 17 0 - 45 U/L    ALT 16 0 - 50 U/L    Protein Total 7.6 6.4 - 8.3 g/dL    Albumin 4.4 3.5 - 5.2 g/dL    Bilirubin Total 0.2 <=1.2 mg/dL   HIV Antigen Antibody Combo     Status: Normal   Result Value Ref Range    HIV Antigen Antibody Combo Nonreactive Nonreactive   INR     Status: Normal   Result Value Ref Range    INR 0.95 0.85 - 1.15   Partial thromboplastin time     Status: Normal   Result Value Ref Range    aPTT 31 22 - 38 Seconds   HCG qualitative     Status: Normal   Result Value Ref Range    hCG Serum Qualitative Negative Negative   CBC with platelets and differential     Status: None   Result Value Ref Range    WBC Count 7.0 4.0 - 11.0 10e3/uL    RBC Count 3.90 3.80 - 5.20 10e6/uL    Hemoglobin 12.3 11.7 - 15.7 g/dL    Hematocrit 36.8 35.0 - 47.0 %    MCV 94 78 - 100 fL    MCH 31.5 26.5 - 33.0 pg    MCHC 33.4 31.5 - 36.5 g/dL    RDW 13.2 10.0 - 15.0 %    Platelet Count 226 150 - 450 10e3/uL    % Neutrophils 70 %    % Lymphocytes 20 %    % Monocytes 8 %    % Eosinophils 1 %    % Basophils 0 %    % Immature Granulocytes 0 %    Absolute Neutrophils 4.9 1.6 - 8.3 10e3/uL    Absolute Lymphocytes 1.4 0.8 - 5.3 10e3/uL    Absolute Monocytes 0.6 0.0 - 1.3 10e3/uL    Absolute Eosinophils 0.1 0.0 - 0.7 10e3/uL    Absolute Basophils 0.0 0.0 - 0.2 10e3/uL    Absolute Immature Granulocytes 0.0 <=0.4 10e3/uL   CBC with platelets and differential     Status: None    Narrative    The following orders were created for panel order CBC with platelets and differential.  Procedure                               Abnormality         Status                     ---------                               -----------         ------                     CBC with platelets and d...[[id number]]                       Final result                 Please view results for these tests on the individual orders.       No EKG required, no history of coronary heart disease, significant arrhythmia, peripheral arterial disease or other structural heart disease.    Revised Cardiac Risk Index (RCRI)  The patient has the following serious cardiovascular risks for perioperative complications:   - No serious cardiac risks = 0 points     RCRI Interpretation: 0 points: Class I (very low risk - 0.4% complication rate)    Signed Electronically by: Donavon Mehta MD  Copy of this evaluation report is provided to requesting physician.

## 2024-07-23 LAB
APTT PPP: 31 SECONDS (ref 22–38)
INR PPP: 0.95 (ref 0.85–1.15)

## 2024-09-24 ENCOUNTER — PATIENT OUTREACH (OUTPATIENT)
Dept: CARE COORDINATION | Facility: CLINIC | Age: 34
End: 2024-09-24
Payer: COMMERCIAL

## 2024-10-08 ENCOUNTER — PATIENT OUTREACH (OUTPATIENT)
Dept: CARE COORDINATION | Facility: CLINIC | Age: 34
End: 2024-10-08
Payer: COMMERCIAL

## 2025-01-11 ENCOUNTER — HEALTH MAINTENANCE LETTER (OUTPATIENT)
Age: 35
End: 2025-01-11

## 2025-02-26 ENCOUNTER — OFFICE VISIT (OUTPATIENT)
Dept: PLASTIC SURGERY | Facility: CLINIC | Age: 35
End: 2025-02-26
Payer: COMMERCIAL

## 2025-02-26 VITALS
BODY MASS INDEX: 31.91 KG/M2 | HEIGHT: 69 IN | DIASTOLIC BLOOD PRESSURE: 80 MMHG | HEART RATE: 96 BPM | OXYGEN SATURATION: 100 % | SYSTOLIC BLOOD PRESSURE: 125 MMHG

## 2025-02-26 DIAGNOSIS — Z98.890 S/P ABDOMINOPLASTY: Primary | ICD-10-CM

## 2025-02-26 RX ORDER — INFLUENZA A VIRUS A/VICTORIA/4897/2022 IVR-238 (H1N1) ANTIGEN (FORMALDEHYDE INACTIVATED), INFLUENZA A VIRUS A/CALIFORNIA/122/2022 SAN-022 (H3N2) ANTIGEN (FORMALDEHYDE INACTIVATED), AND INFLUENZA B VIRUS B/MICHIGAN/01/2021 ANTIGEN (FORMALDEHYDE INACTIVATED) 15; 15; 15 MG/.5ML; MG/.5ML; MG/.5ML
INJECTION, SUSPENSION INTRAMUSCULAR
COMMUNITY
Start: 2024-10-25

## 2025-02-26 ASSESSMENT — PAIN SCALES - GENERAL: PAINLEVEL_OUTOF10: NO PAIN (0)

## 2025-02-26 NOTE — NURSING NOTE
"(   Chief Complaint   Patient presents with    Consult     Discuss revision of umbilicus s/p abdominoplasty    )    (   )  ( Height: 176 cm (5' 9.3\") )  (   )  (   )  (   )  (   )  (   )  (   )  (   )    ( BP: 125/80 )  (   )  (   )  (   )  ( Pulse: 96 )  (   )  ( SpO2: 100 % )    (   Patient Active Problem List   Diagnosis    CARDIOVASCULAR SCREENING; LDL GOAL LESS THAN 160    Acne    Menorrhagia    Primary anovulatory infertility    Female infertility    Pregnancy with history of infertility    Pregnancy with history of infertility, second trimester    Dizygotic twins    Elevated blood pressure affecting pregnancy in third trimester, antepartum    Excess skin of abdominal wall    )  (   Current Outpatient Medications   Medication Sig Dispense Refill    FLUZONE 0.5 ML injection       STATUS COVID-19/FLU A&B KIT TEST AS DIRECTED TODAY      Semaglutide-Weight Management (WEGOVY) 1 MG/0.5ML pen Inject 1 mg Subcutaneous once a week (Patient not taking: Reported on 2/26/2025) 2 mL 3    )  ( Diabetes Eval:    )    ( Pain Eval:  No Pain (0) )    ( Wound Eval:       )    (   History   Smoking Status    Never   Smokeless Tobacco    Never    )    ( Signed By:  Romana Blanca, EMT February 26, 2025; 10:39 AM )    "

## 2025-02-26 NOTE — LETTER
2025       RE: Syed Orantes  [address] [facility] [address]     Dear Colleague,    Thank you for referring your patient, Syed Orantes, to the [facility] [place] [facility]. Please see a copy of my visit note below.    Referring Provider:  None    Primary Care Provider:  Donavon Mehta      RE: Syed Orantes.  : 1990.  MEGAN: 2025.    Reason for visit: Umbilical issues    HPI: The patient had a cosmetic abdominoplasty done last year in 2024.  She is very happy with results except for the aesthetics of her umbilicus.  She feels that it is a little Dwaine and the scar is extremely visible and it is widened.  Here to have it looked at.    Medical history:  Past Medical History:   Diagnosis Date     Cholelithiasis and acute cholecystitis with obstruction      Cholelithiasis without obstruction 2011     Personal history of asthma     her asthma went away after she became active in high school sports.        Surgical history:  Past Surgical History:   Procedure Laterality Date      SECTION       CHOLECYSTECTOMY, LAPOROSCOPIC       COSMETIC SURGERY      breast reduction      HC INSERT IMPLANTABLE CONTRACEPTIVE CAPSULE       HC INSERTION INTRAUTERINE DEVICE       HC REMOVAL IMPLATABLE CONTRACEPTIVE CAPSULE       HC REMOVE INTRAUTERINE DEVICE         Family history:  Family History   Problem Relation Age of Onset     Hypertension Mother      Hypertension Father      Thyroid Disease Maternal Grandmother      Hypertension Maternal Grandmother      Colon Cancer Maternal Grandmother         Great grandmother     Breast Cancer No family hx of        Medications:  Current Outpatient Medications   Medication Sig Dispense Refill     FLUZONE 0.5 ML injection        STATUS COVID-19/FLU A&B KIT TEST AS DIRECTED TODAY       Semaglutide-Weight  "Management (WEGOVY) 1 MG/0.5ML pen Inject 1 mg Subcutaneous once a week (Patient not taking: Reported on 2/26/2025) 2 mL 3       Allergies:  Allergies   Allergen Reactions     Actos [Pioglitazone] Headache     Amoxicillin Hives            Metformin GI Disturbance     Pcn [Penicillin G] Hives       Social history:   Social History     Tobacco Use     Smoking status: Never     Smokeless tobacco: Never   Substance Use Topics     Alcohol use: Yes     Comment: socially -not in PG         Physical Examination:  /80 (BP Location: Left arm, Patient Position: Sitting, Cuff Size: Adult Large)   Pulse 96   Ht 1.76 m (5' 9.3\")   SpO2 100%   BMI 31.91 kg/m    Body mass index is 31.91 kg/m .    General: No acute distress.    Umbilicus: Completely healed umbilicus with a very prominent scar and fatty deposit surrounding it.  No hernia.        ASSESMENT and PLAN:     Based upon the above findings, a diagnosis of unaesthetic umbilical.  S/p abdominoplasty was made.  I had a kasandra, detailed discussion with the patient, in the presence of my nurse, who was present from beginning to end.  I discussed with the patient the pathophysiology behind the problem, the concept behind the procedure/treatment proposed, expectations of the planned procedure(s), and all simone-operative steps.     We discussed options of doing nothing versus a revision of the umbilical plasty.  This would entail trying to make the scar more indented, try and remove some of the adiposity that is present, and the umbilical stalk a little more of an \"inny\".  Additionally the patient has keloids and we discussed that she may develop a keloid again.  She understood.  We did discuss steroid injections which she did not want currently given the risk of hypopigmentation.  We went over the potential risks of the surgery including infections, scarring, poor scarring, injury to deeper structures, requirement of further surgeries.    All risks, benefits and " alternatives, including but not limited to (what applies), pain, infection, bleeding, scarring, asymmetry, seromas, hematomas, wound breakdown, wound dehiscence, loss of the implants/flaps, abdominal wall-healing issues, abdominal wall weakness, bulges, hernias, sensation loss, requirement of further staged procedures, Implant specific issues and complications as discussed above, removal of infected or exposed implants, pneumothoraces, contour abnormalities, cannula injuries to deeper structures, hernias, fat necrosis, lumps and bumps, loss of grafted material, DVT, PE, MI, CVA, pneumonia, renal failure and death, were explained. They were understood and agreed upon by the patient, they were acknowledged by the patient, all the patient's questions were answered in detail to the patient's fullest understanding that they acknowledged, the team approach for treatment in the operating room was agreed upon by the patient, and proceeding with surgery was agreed upon by the patient.      All questions were answered. The patient was happy with the visit. I look forward to helping the patient out in the near future as indicated.       Total time spent in the encounter today including chart review, visit itself, and post-visit paperwork was 45 minutes.       Ivy Jensen MD    Chief, Division of Plastic Surgery  Department of Surgery  [facility]      CC: JOAN Jensen MD  [address] [place] [id number]  CC: Donavon Mehta      Again, thank you for allowing me to participate in the care of your patient.      Sincerely,    JOAN Jensen MD

## 2025-02-27 RX ORDER — CEFAZOLIN SODIUM 2 G/50ML
2 SOLUTION INTRAVENOUS SEE ADMIN INSTRUCTIONS
OUTPATIENT
Start: 2025-02-27

## 2025-02-27 RX ORDER — CEFAZOLIN SODIUM 2 G/50ML
2 SOLUTION INTRAVENOUS
OUTPATIENT
Start: 2025-02-27

## 2025-02-27 NOTE — PROGRESS NOTES
Referring Provider:  None    Primary Care Provider:  Donavon Mehta      RE: Syed Orantes.  : 1990.  MEGAN: 2025.    Reason for visit: Umbilical issues    HPI: The patient had a cosmetic abdominoplasty done last year in 2024.  She is very happy with results except for the aesthetics of her umbilicus.  She feels that it is a little Dwaine and the scar is extremely visible and it is widened.  Here to have it looked at.    Medical history:  Past Medical History:   Diagnosis Date    Cholelithiasis and acute cholecystitis with obstruction     Cholelithiasis without obstruction 2011    Personal history of asthma     her asthma went away after she became active in high school sports.        Surgical history:  Past Surgical History:   Procedure Laterality Date     SECTION      CHOLECYSTECTOMY, LAPOROSCOPIC      COSMETIC SURGERY      breast reduction     HC INSERT IMPLANTABLE CONTRACEPTIVE CAPSULE      HC INSERTION INTRAUTERINE DEVICE      HC REMOVAL IMPLATABLE CONTRACEPTIVE CAPSULE      HC REMOVE INTRAUTERINE DEVICE         Family history:  Family History   Problem Relation Age of Onset    Hypertension Mother     Hypertension Father     Thyroid Disease Maternal Grandmother     Hypertension Maternal Grandmother     Colon Cancer Maternal Grandmother         Great grandmother    Breast Cancer No family hx of        Medications:  Current Outpatient Medications   Medication Sig Dispense Refill    FLUZONE 0.5 ML injection       STATUS COVID-19/FLU A&B KIT TEST AS DIRECTED TODAY      Semaglutide-Weight Management (WEGOVY) 1 MG/0.5ML pen Inject 1 mg Subcutaneous once a week (Patient not taking: Reported on 2025) 2 mL 3       Allergies:  Allergies   Allergen Reactions    Actos [Pioglitazone] Headache    Amoxicillin Hives           Metformin GI Disturbance    Pcn [Penicillin G] Hives       Social history:   Social History     Tobacco Use    Smoking status: Never     "Smokeless tobacco: Never   Substance Use Topics    Alcohol use: Yes     Comment: socially -not in PG         Physical Examination:  /80 (BP Location: Left arm, Patient Position: Sitting, Cuff Size: Adult Large)   Pulse 96   Ht 1.76 m (5' 9.3\")   SpO2 100%   BMI 31.91 kg/m    Body mass index is 31.91 kg/m .    General: No acute distress.    Umbilicus: Completely healed umbilicus with a very prominent scar and fatty deposit surrounding it.  No hernia.        ASSESMENT and PLAN:     Based upon the above findings, a diagnosis of unaesthetic umbilical.  S/p abdominoplasty was made.  I had a kasandra, detailed discussion with the patient, in the presence of my nurse, who was present from beginning to end.  I discussed with the patient the pathophysiology behind the problem, the concept behind the procedure/treatment proposed, expectations of the planned procedure(s), and all simone-operative steps.     We discussed options of doing nothing versus a revision of the umbilical plasty.  This would entail trying to make the scar more indented, try and remove some of the adiposity that is present, and the umbilical stalk a little more of an \"inny\".  Additionally the patient has keloids and we discussed that she may develop a keloid again.  She understood.  We did discuss steroid injections which she did not want currently given the risk of hypopigmentation.  We went over the potential risks of the surgery including infections, scarring, poor scarring, injury to deeper structures, requirement of further surgeries.    All risks, benefits and alternatives, including but not limited to (what applies), pain, infection, bleeding, scarring, asymmetry, seromas, hematomas, wound breakdown, wound dehiscence, loss of the implants/flaps, abdominal wall-healing issues, abdominal wall weakness, bulges, hernias, sensation loss, requirement of further staged procedures, Implant specific issues and complications as discussed above, removal " of infected or exposed implants, pneumothoraces, contour abnormalities, cannula injuries to deeper structures, hernias, fat necrosis, lumps and bumps, loss of grafted material, DVT, PE, MI, CVA, pneumonia, renal failure and death, were explained. They were understood and agreed upon by the patient, they were acknowledged by the patient, all the patient's questions were answered in detail to the patient's fullest understanding that they acknowledged, the team approach for treatment in the operating room was agreed upon by the patient, and proceeding with surgery was agreed upon by the patient.      All questions were answered. The patient was happy with the visit. I look forward to helping the patient out in the near future as indicated.       Total time spent in the encounter today including chart review, visit itself, and post-visit paperwork was 45 minutes.       Ivy Jensen MD    Chief, Division of Plastic Surgery  Department of Surgery  [facility]      CC: JOAN Jensen MD  [address]  [place] [address]  CC: Donavon Mehta

## 2025-03-03 ENCOUNTER — TELEPHONE (OUTPATIENT)
Dept: PLASTIC SURGERY | Facility: CLINIC | Age: 35
End: 2025-03-03
Payer: COMMERCIAL

## 2025-03-03 NOTE — TELEPHONE ENCOUNTER
Left voicemail for patient regarding scheduling surgery with Dr. Jensen.    Provided contact number to discuss.    P: [phone]    __    Bren Cabrera, on 3/3/2025 at 2:26 PM

## 2025-03-11 PROBLEM — Z98.890 S/P ABDOMINOPLASTY: Status: ACTIVE | Noted: 2025-02-26

## 2025-04-22 ENCOUNTER — PATIENT OUTREACH (OUTPATIENT)
Dept: CARE COORDINATION | Facility: CLINIC | Age: 35
End: 2025-04-22
Payer: COMMERCIAL

## 2025-05-13 NOTE — MR AVS SNAPSHOT
After Visit Summary   5/12/2017    Syed Orantes    MRN: [id number]           Patient Information     Date Of Birth          1990        Visit Information        Provider Department      5/12/2017 8:30 AM Eduardo Arthur MD [facility] [place]        Today's Diagnoses     Pregnancy with history of infertility, first trimester    -  1       Follow-ups after your visit        Follow-up notes from your care team     Return in about 4 weeks (around 6/9/2017).      Your next 10 appointments already scheduled     May 15, 2017  6:20 PM CDT   US OB < 14 WEEKS WITH TRANSVAGINAL SINGLE with BEUS1   [facility] [place] ([facility])    [address]  [place] [id number] [phone]           Please bring a list of your medicines (including vitamins, minerals and over-the-counter drugs). Also, tell your doctor about any allergies you may have. Wear comfortable clothes and leave your valuables at home.  If you re less than 20 weeks drink four 8-ounce glasses of fluid an hour before your exam. If you need to empty your bladder before your exam, try to release only a little urine. Then, drink another glass of fluid.  You may have up to two family members in the exam room. If you bring a small child, an adult must be there to care for him or her.  Please call the Imaging Department at your exam site with any questions.            Jun 09, 2017  8:15 AM CDT   ESTABLISHED PRENATAL with Eduardo Arthur MD   [facility] [place] ([facility] [place]    [address]  [place] [address] [phone]              Future tests that were ordered for you today     Open Future Orders        Priority Expected Expires Ordered    US OB <14 Weeks w Transvaginal Single Routine  5/12/2018 5/12/2017            Who to contact     If you have questions or need follow up information about today's clinic visit or your schedule please contact [place]  Pain/nausea control throughout this shift 5/12/25 @ 1900      Problem: Pain - Adult  Goal: Verbalizes/displays adequate comfort level or baseline comfort level  Outcome: Progressing     Problem: Safety - Adult  Goal: Free from fall injury  Outcome: Progressing     Problem: Discharge Planning  Goal: Discharge to home or other facility with appropriate resources  Outcome: Progressing     Problem: Chronic Conditions and Co-morbidities  Goal: Patient's chronic conditions and co-morbidity symptoms are monitored and maintained or improved  Outcome: Progressing     Problem: Nutrition  Goal: Nutrient intake appropriate for maintaining nutritional needs  Outcome: Progressing     Problem: Fall/Injury  Goal: Not fall by end of shift  Outcome: Progressing  Goal: Be free from injury by end of the shift  Outcome: Progressing  Goal: Verbalize understanding of personal risk factors for fall in the hospital  Outcome: Progressing  Goal: Verbalize understanding of risk factor reduction measures to prevent injury from fall in the home  Outcome: Progressing  Goal: Use assistive devices by end of the shift  Outcome: Progressing  Goal: Pace activities to prevent fatigue by end of the shift  Outcome: Progressing     Problem: Pain  Goal: Takes deep breaths with improved pain control throughout the shift  Outcome: Progressing  Goal: Turns in bed with improved pain control throughout the shift  Outcome: Progressing  Goal: Walks with improved pain control throughout the shift  Outcome: Progressing  Goal: Performs ADL's with improved pain control throughout shift  Outcome: Progressing  Goal: Participates in PT with improved pain control throughout the shift  Outcome: Progressing  Goal: Free from opioid side effects throughout the shift  Outcome: Progressing  Goal: Free from acute confusion related to pain meds throughout the shift  Outcome: Progressing     Problem: Skin  Goal: Participates in plan/prevention/treatment measures  Outcome:  Progressing  Goal: Prevent/manage excess moisture  Outcome: Progressing  Goal: Prevent/minimize sheer/friction injuries  Outcome: Progressing  Goal: Promote/optimize nutrition  Outcome: Progressing  Goal: Promote skin healing  Outcome: Progressing        [facility] directly at [phone].  Normal or non-critical lab and imaging results will be communicated to you by MyChart, letter or phone within 4 business days after the clinic has received the results. If you do not hear from us within 7 days, please contact the clinic through [employer] or phone. If you have a critical or abnormal lab result, we will notify you by phone as soon as possible.  Submit refill requests through [employer] or call your pharmacy and they will forward the refill request to us. Please allow 3 business days for your refill to be completed.          Additional Information About Your Visit        [employer] Information     [employer] gives you secure access to your electronic health record. If you see a primary care provider, you can also send messages to your care team and make appointments. If you have questions, please call your primary care clinic.  If you do not have a primary care provider, please call [phone] and they will assist you.        Care EveryWhere ID     This is your Care EveryWhere ID. This could be used by other organizations to access your [facility] medical records  [id number]        Your Vitals Were     Pulse Last Period Pulse Oximetry BMI (Body Mass Index)          85 03/09/2017 (Exact Date) 100% 37.61 kg/m2         Blood Pressure from Last 3 Encounters:   05/12/17 120/79   04/18/17 120/82   01/31/17 118/79    Weight from Last 3 Encounters:   05/12/17 251 lb (113.9 kg)   04/18/17 246 lb (111.6 kg)   01/31/17 243 lb (110.2 kg)              We Performed the Following     ABO/Rh type and screen     Anti Treponema     CBC with platelets differential     Hepatitis B surface antigen     HIV Antigen Antibody Combo     Rubella Antibody IgG Quantitative     Urine Culture Aerobic Bacterial        Primary Care Provider Office Phone # Fax #    Gianna Price [phone]       [address] [address]        Thank you!      Thank you for choosing [facility]  for your care. Our goal is always to provide you with excellent care. Hearing back from our patients is one way we can continue to improve our services. Please take a few minutes to complete the written survey that you may receive in the mail after your visit with us. Thank you!             Your Updated Medication List - Protect others around you: Learn how to safely use, store and throw away your medicines at www.disposemymeds.org.          This list is accurate as of: 5/12/17  9:33 AM.  Always use your most recent med list.                   Brand Name Dispense Instructions for use    Prenatal Vitamin 27-0.8 MG Tabs     90 tablet    Take 1 tablet by mouth daily

## 2025-06-05 PROBLEM — O09.00 PREGNANCY WITH HISTORY OF INFERTILITY: Status: RESOLVED | Noted: 2017-05-12 | Resolved: 2025-06-05

## 2025-06-05 PROBLEM — O09.02: Status: RESOLVED | Noted: 2017-06-09 | Resolved: 2025-06-05

## 2025-06-05 PROBLEM — Z98.890 S/P ABDOMINOPLASTY: Status: RESOLVED | Noted: 2025-02-26 | Resolved: 2025-06-05

## 2025-06-05 PROBLEM — N97.0 PRIMARY ANOVULATORY INFERTILITY: Status: RESOLVED | Noted: 2017-02-28 | Resolved: 2025-06-05

## 2025-06-05 PROBLEM — N97.9 FEMALE INFERTILITY: Status: RESOLVED | Noted: 2017-02-28 | Resolved: 2025-06-05

## 2025-06-05 PROBLEM — L98.7 EXCESS SKIN OF ABDOMINAL WALL: Status: RESOLVED | Noted: 2018-03-21 | Resolved: 2025-06-05

## 2025-06-13 ENCOUNTER — RESULTS FOLLOW-UP (OUTPATIENT)
Dept: FAMILY MEDICINE | Facility: CLINIC | Age: 35
End: 2025-06-13

## 2025-06-19 PROBLEM — Z98.890 S/P ABDOMINOPLASTY: Status: ACTIVE | Noted: 2025-02-26

## 2025-06-20 ENCOUNTER — HOSPITAL ENCOUNTER (OUTPATIENT)
Facility: AMBULATORY SURGERY CENTER | Age: 35
Discharge: HOME | End: 2025-06-20
Attending: PLASTIC SURGERY | Admitting: PLASTIC SURGERY

## 2025-06-20 VITALS
SYSTOLIC BLOOD PRESSURE: 113 MMHG | DIASTOLIC BLOOD PRESSURE: 72 MMHG | TEMPERATURE: 97.3 F | WEIGHT: 239 LBS | BODY MASS INDEX: 35 KG/M2 | HEART RATE: 80 BPM | OXYGEN SATURATION: 98 % | RESPIRATION RATE: 16 BRPM

## 2025-06-20 LAB — HCG UR QL: NEGATIVE

## 2025-06-20 PROCEDURE — 360N000050 HC SURGERY LEVEL COSMETIC 30 MIN

## 2025-06-20 PROCEDURE — G8907 PT DOC NO EVENTS ON DISCHARG: HCPCS

## 2025-06-20 PROCEDURE — 81025 URINE PREGNANCY TEST: CPT | Performed by: PLASTIC SURGERY

## 2025-06-20 PROCEDURE — G8916 PT W IV AB GIVEN ON TIME: HCPCS

## 2025-06-20 PROCEDURE — 360N000058 HC SURGERY LEVEL COSMETIC EACH ADDL 30 MIN OVER QUOTE ESTIMATE

## 2025-06-20 RX ORDER — BUPIVACAINE HYDROCHLORIDE 2.5 MG/ML
INJECTION, SOLUTION INFILTRATION; PERINEURAL PRN
Status: DISCONTINUED | OUTPATIENT
Start: 2025-06-20 | End: 2025-06-20 | Stop reason: HOSPADM

## 2025-06-20 RX ORDER — SODIUM CHLORIDE, SODIUM LACTATE, POTASSIUM CHLORIDE, CALCIUM CHLORIDE 600; 310; 30; 20 MG/100ML; MG/100ML; MG/100ML; MG/100ML
INJECTION, SOLUTION INTRAVENOUS CONTINUOUS
Status: DISCONTINUED | OUTPATIENT
Start: 2025-06-20 | End: 2025-06-21 | Stop reason: HOSPADM

## 2025-06-20 RX ORDER — ONDANSETRON 4 MG/1
4 TABLET, ORALLY DISINTEGRATING ORAL EVERY 30 MIN PRN
Status: DISCONTINUED | OUTPATIENT
Start: 2025-06-20 | End: 2025-06-21 | Stop reason: HOSPADM

## 2025-06-20 RX ORDER — NALOXONE HYDROCHLORIDE 0.4 MG/ML
0.1 INJECTION, SOLUTION INTRAMUSCULAR; INTRAVENOUS; SUBCUTANEOUS
Status: DISCONTINUED | OUTPATIENT
Start: 2025-06-20 | End: 2025-06-21 | Stop reason: HOSPADM

## 2025-06-20 RX ORDER — SCOPOLAMINE 1 MG/3D
1 PATCH, EXTENDED RELEASE TRANSDERMAL ONCE
Status: DISCONTINUED | OUTPATIENT
Start: 2025-06-20 | End: 2025-06-21 | Stop reason: HOSPADM

## 2025-06-20 RX ORDER — DEXAMETHASONE SODIUM PHOSPHATE 4 MG/ML
4 INJECTION, SOLUTION INTRA-ARTICULAR; INTRALESIONAL; INTRAMUSCULAR; INTRAVENOUS; SOFT TISSUE
Status: DISCONTINUED | OUTPATIENT
Start: 2025-06-20 | End: 2025-06-21 | Stop reason: HOSPADM

## 2025-06-20 RX ORDER — FENTANYL CITRATE 50 UG/ML
50 INJECTION, SOLUTION INTRAMUSCULAR; INTRAVENOUS EVERY 5 MIN PRN
Status: DISCONTINUED | OUTPATIENT
Start: 2025-06-20 | End: 2025-06-21 | Stop reason: HOSPADM

## 2025-06-20 RX ORDER — ONDANSETRON 2 MG/ML
4 INJECTION INTRAMUSCULAR; INTRAVENOUS EVERY 30 MIN PRN
Status: DISCONTINUED | OUTPATIENT
Start: 2025-06-20 | End: 2025-06-21 | Stop reason: HOSPADM

## 2025-06-20 RX ORDER — LIDOCAINE 40 MG/G
CREAM TOPICAL
Status: DISCONTINUED | OUTPATIENT
Start: 2025-06-20 | End: 2025-06-21 | Stop reason: HOSPADM

## 2025-06-20 RX ORDER — OXYCODONE HYDROCHLORIDE 5 MG/1
5 TABLET ORAL
Status: COMPLETED | OUTPATIENT
Start: 2025-06-20 | End: 2025-06-20

## 2025-06-20 RX ORDER — FENTANYL CITRATE 50 UG/ML
25 INJECTION, SOLUTION INTRAMUSCULAR; INTRAVENOUS EVERY 5 MIN PRN
Status: DISCONTINUED | OUTPATIENT
Start: 2025-06-20 | End: 2025-06-21 | Stop reason: HOSPADM

## 2025-06-20 RX ORDER — CEFAZOLIN SODIUM 2 G/50ML
2 SOLUTION INTRAVENOUS SEE ADMIN INSTRUCTIONS
Status: DISCONTINUED | OUTPATIENT
Start: 2025-06-20 | End: 2025-06-21 | Stop reason: HOSPADM

## 2025-06-20 RX ORDER — ACETAMINOPHEN 325 MG/1
975 TABLET ORAL ONCE
Status: COMPLETED | OUTPATIENT
Start: 2025-06-20 | End: 2025-06-20

## 2025-06-20 RX ORDER — OXYCODONE HYDROCHLORIDE 5 MG/1
10 TABLET ORAL
Status: DISCONTINUED | OUTPATIENT
Start: 2025-06-20 | End: 2025-06-21 | Stop reason: HOSPADM

## 2025-06-20 RX ORDER — CEFAZOLIN SODIUM 2 G/50ML
2 SOLUTION INTRAVENOUS
Status: COMPLETED | OUTPATIENT
Start: 2025-06-20 | End: 2025-06-20

## 2025-06-20 RX ADMIN — SODIUM CHLORIDE, SODIUM LACTATE, POTASSIUM CHLORIDE, CALCIUM CHLORIDE: 600; 310; 30; 20 INJECTION, SOLUTION INTRAVENOUS at 09:58

## 2025-06-20 RX ADMIN — SCOPOLAMINE 1 PATCH: 1 PATCH, EXTENDED RELEASE TRANSDERMAL at 10:02

## 2025-06-20 RX ADMIN — ACETAMINOPHEN 975 MG: 325 TABLET ORAL at 09:50

## 2025-06-20 RX ADMIN — OXYCODONE HYDROCHLORIDE 5 MG: 5 TABLET ORAL at 12:42

## 2025-06-20 NOTE — BRIEF OP NOTE
[facility]    Brief Operative Note    Pre-operative diagnosis: S/P abdominoplasty [Z98.890]  Post-operative diagnosis Same as pre-operative diagnosis    Procedure: Cosmetic revision umbilical plasty and abdominal scar revision, N/A - Abdomen    Surgeon: Surgeons and Role:     * JOAN Jensen MD - Primary     * Ange Parker PA-C - Assisting     * Jazlyn Pathak MD - Assisting  Anesthesia: General   Estimated Blood Loss: Minimal    Drains: None  Specimens: * No specimens in log *  Findings:   Refer to Op NOTE.  Complications: None.  Implants: * No implants in log *    Jazlyn Meyer MD  Plastic Surgery Resident, PGY-1

## 2025-06-20 NOTE — PROGRESS NOTES
No change in History and Physical since the last visit.  I went over the planned procedure in detail today.  All risks, benefits and alternatives were explained in detail again.  All questions were answered.  The patient understood the plan and all that was discussed and wants to proceed with the planned procedure today.  The patient understands the team approach to care in the operating room, potential overlapping of cases in rooms and agrees to the procedure as such.  The patient has been cleared by medicine for this procedure and all laboratory tests are stable.    The patient in addition to the umbilical plasty also wanted me to revise the midportion of her abdominal scar that was quite prominent.  We talked about recurrence of the keloid if steroid injections are not used.  The side effects of steroid including hypopigmentation were discussed.  She wants me to just remove it.  I was very clear that it could recur and it can even recur worse than it currently is.  She was okay with that and wanted me to proceed.  We will add it to the consent.

## 2025-06-20 NOTE — OP NOTE
PREOPERATIVE DIAGNOSIS: S/p abdominoplasty with abnormal umbilicus and prominent scar in the mid abdominal scar requiring revision    POSTOPERATIVE DIAGNOSIS: As above    PROCEDURES:   1.  Revision umbilical plasty  2.  Scar revision of abdomen measuring 3 cm with excision of skin and subcutaneous tissue and layered closure     SURGEON: Ivy Jesnen MD      RESIDENT: Jazlyn Pathak MD     ANESTHESIA: General anesthesia with LMA     COMPLICATIONS: Nil.      DRAINS: None     SPECIMENS: None     BLOOD LOSS: 5 mL        DESCRIPTION OF PROCEDURE: After informed consent was taken from the patient, the proper site and procedure was ascertained with them and they were appropriately marked, they were taken to the operating room. They were placed in a supine position with their knees comfortably flexed with pillows underneath them, and pneumoboots placed and running prior to induction of anesthesia. Preoperative antibiotics were given in the OR and appropriately redosed during the case. General anesthesia was administered without any complications.  She was prepped and draped in the standard surgical fashion.     I began by first cutting around the old umbilical scar which was very prominent and the umbilicus was very wide and protuberant.  I then shorten the umbilical stalk and removed the excess skin.  I then dissected out the stalk down to the abdominal wall.  I then sutured the stalk down to the anterior abdominal wall with 0 PDS sutures.  I then freshened the skin edge of the abdominal skin where the original scar was.  I then sewed the edge of the skin of the abdomen to the edge of the skin of the umbilicus and also took a deep bite of the deeper tissues in that 1 bite at the 3:00 6:00 and 9:00 positions.  The 12 o'clock position was just a dermal approximation.  I then closed the skin with 4-0 half buried mattress Prolene sutures.  The scar of the abdominal incision was vertically excised approximately 3 cm  long including skin and subcutaneous tissues.  Hemostasis ensured.  The wound was closed using 2-0 Monocryl suture in a deep dermal layer followed by running 4-0 plain suture.  Surgical tape and glue was placed on top.    The patient tolerated the procedure well. All counts were correct at the end of the case. The patient was extubated and sent to recovery room in stable condition.

## 2025-06-20 NOTE — DISCHARGE INSTRUCTIONS
SCAR REVISION OR EXCISION OF LESIONS POST-OPERATIVE INSTRUCTIONS    Instructions      Have someone drive you home after surgery and help you at home for 1-2      days.     Get plenty of rest.     Follow balanced diet.     Decreased activity may promote constipation, so you may want to add      more raw fruit to your diet, and be sure to increase fluid intake.     Take pain medication as prescribed. Do not take aspirin or any products      containing aspirin.     Do not drink alcohol when taking pain medications.     Even when not taking pain medications, no alcohol for 3 weeks as it      causes fluid retention.     If you are taking vitamins with iron, resume these as tolerated.     Do not smoke, as smoking delays healing and increases the risk of      complications.    Activities     Do not drive until you are no longer taking any pain medications      (narcotics).     Start walking as soon as possible, this helps to reduce swelling and       lowers the chance of blood clots.     Resume normal activities gradually.     Return to work in 1-2 days, depending upon extent of surgery     No strenuous work or stress on wound for 2-3 weeks.    Incision Care     The wound is covered with a waterproof dressing.  The incision on the abdomen is covered with tape and glue.  Both are waterproof.  You may start showering from tomorrow.  No heavy activities for about 4 weeks.  The umbilical dressing can be taken down in about 5 days.  After that Vaseline twice daily to the incision.  The abdominal incision can be moisturized with Vaseline starting in about 7 to 10 days and by 14 days you may peel off the tape and glue.  Tylenol and ibuprofen for pain.     Avoid exposing scars to sun for at least 12 months.     Always use a strong sunblock, if sun exposure is unavoidable (SPF 50 or      greater).     Inspect daily for signs of infection.     No tub soaking, bathing, or swimming while sutures or drains are in place.     Keep area  clean and dry for first 24 hours.     What to Expect     Some swelling and bruising.     May have slight bleeding from incision.  Apply 4 x 4 gauze with slight pressure to       control bleeding.     Skin grafts and flaps may take several weeks or months to heal; a support garment or      bandage may be necessary for up to a year.    Appearance     Final results of surgery may take a year or more.    Follow-Up Care     If external sutures have been used, they will be removed in 5-14 days.    Please note my office will call you 1-2 business days after your procedure to check up on how you're doing and to schedule your post-operative appointment.        When to Call     If you have increased swelling or bruising.     If swelling and redness persist after a few days.     If you have increased redness along the incision.     If you have severe or increased pain not relieved by medication.     If you have any side effects to medications; such as, rash, nausea,      headache, vomiting.     If you have an oral temperature over 100.4 degrees.     If you have any yellowish or greenish drainage from the incisions or      notice a foul odor.     If you have bleeding from the incisions that is difficult to control with      light pressure.     If you have loss of feeling or motion.    For Medical Questions, Please Call:     [phone], Monday - Friday, 8 a.m. - 4:30 p.m.     After hours and on weekends, call Hospital Paging at [phone] and      ask for the Plastic Surgeon on call.    Tylenol 975 mg was given at 9:50 AM. Next dose available after 3:50 PM.    You should not take more then 4,000 mg of tylenol/acetaminophen in a 24 hour period.

## 2025-06-24 ENCOUNTER — TELEPHONE (OUTPATIENT)
Dept: PLASTIC SURGERY | Facility: CLINIC | Age: 35
End: 2025-06-24
Payer: COMMERCIAL

## 2025-06-24 ENCOUNTER — MYC MEDICAL ADVICE (OUTPATIENT)
Dept: PLASTIC SURGERY | Facility: CLINIC | Age: 35
End: 2025-06-24
Payer: COMMERCIAL

## 2025-08-04 RX ORDER — SEMAGLUTIDE 1.7 MG/.75ML
INJECTION, SOLUTION SUBCUTANEOUS
Qty: 3 ML | Refills: 0 | Status: SHIPPED | OUTPATIENT
Start: 2025-08-04

## (undated) DEVICE — DRSG GAUZE 4X4" 3033

## (undated) DEVICE — ESU PENCIL SMOKE EVAC W/ROCKER SWITCH 0703-047-000

## (undated) DEVICE — GLOVE BIOGEL PI MICRO SZ 7.0 48570

## (undated) DEVICE — DRAPE SHEET HALF 40X60" 9358

## (undated) DEVICE — NDL 19GA 1.5"

## (undated) DEVICE — DRAPE IOBAN INCISE 23X17" 6650EZ

## (undated) DEVICE — PACK MINOR SBA15MIFSE

## (undated) DEVICE — SUCTION TIP YANKAUER W/O VENT K86

## (undated) DEVICE — MARKER SKIN DOUBLE TIP W/FLEXI-RULER W/LABELS

## (undated) DEVICE — SYR BULB IRRIG DOVER 60 ML LATEX FREE 67000

## (undated) DEVICE — DRSG XEROFORM 1X8"

## (undated) DEVICE — NDL COUNTER 20CT 31142493

## (undated) DEVICE — DRSG TEGADERM 4X4 3/4" 1626W

## (undated) DEVICE — SU PROLENE 4-0 PS-2 18" 8682G

## (undated) DEVICE — DRAPE U SPLIT 74X120" 29440

## (undated) DEVICE — SPONGE LAP 18X18" 1515

## (undated) DEVICE — PREP CHLORAPREP 26ML TINTED ORANGE  260815

## (undated) DEVICE — SU MONOCRYL 2-0 SH 27" UND Y417H

## (undated) DEVICE — GLOVE BIOGEL PI MICRO INDICATOR UNDERGLOVE SZ 7.5 48975

## (undated) DEVICE — SU PDS II 0 CT-1 27" Z340H

## (undated) DEVICE — BLADE KNIFE SURG 10 371110

## (undated) DEVICE — SOL WATER IRRIG 1000ML BOTTLE 07139-09

## (undated) RX ORDER — CEFAZOLIN SODIUM 2 G/50ML
SOLUTION INTRAVENOUS
Status: DISPENSED
Start: 2025-06-20

## (undated) RX ORDER — SCOPOLAMINE 1 MG/3D
PATCH, EXTENDED RELEASE TRANSDERMAL
Status: DISPENSED
Start: 2025-06-20

## (undated) RX ORDER — DEXAMETHASONE SODIUM PHOSPHATE 4 MG/ML
INJECTION, SOLUTION INTRA-ARTICULAR; INTRALESIONAL; INTRAMUSCULAR; INTRAVENOUS; SOFT TISSUE
Status: DISPENSED
Start: 2025-06-20

## (undated) RX ORDER — PROPOFOL 10 MG/ML
INJECTION, EMULSION INTRAVENOUS
Status: DISPENSED
Start: 2025-06-20

## (undated) RX ORDER — FENTANYL CITRATE 50 UG/ML
INJECTION, SOLUTION INTRAMUSCULAR; INTRAVENOUS
Status: DISPENSED
Start: 2025-06-20

## (undated) RX ORDER — ACETAMINOPHEN 325 MG/1
TABLET ORAL
Status: DISPENSED
Start: 2025-06-20

## (undated) RX ORDER — OXYCODONE HYDROCHLORIDE 5 MG/1
TABLET ORAL
Status: DISPENSED
Start: 2025-06-20

## (undated) RX ORDER — ONDANSETRON 2 MG/ML
INJECTION INTRAMUSCULAR; INTRAVENOUS
Status: DISPENSED
Start: 2025-06-20